# Patient Record
Sex: FEMALE | Race: WHITE | NOT HISPANIC OR LATINO | Employment: FULL TIME | URBAN - METROPOLITAN AREA
[De-identification: names, ages, dates, MRNs, and addresses within clinical notes are randomized per-mention and may not be internally consistent; named-entity substitution may affect disease eponyms.]

---

## 2017-06-24 ENCOUNTER — HOSPITAL ENCOUNTER (EMERGENCY)
Facility: HOSPITAL | Age: 67
Discharge: HOME | End: 2017-06-24
Attending: EMERGENCY MEDICINE
Payer: COMMERCIAL

## 2017-06-24 ENCOUNTER — APPOINTMENT (EMERGENCY)
Dept: RADIOLOGY | Facility: HOSPITAL | Age: 67
End: 2017-06-24
Payer: COMMERCIAL

## 2017-06-24 VITALS
TEMPERATURE: 98 F | BODY MASS INDEX: 29.72 KG/M2 | HEART RATE: 66 BPM | SYSTOLIC BLOOD PRESSURE: 143 MMHG | HEIGHT: 62 IN | RESPIRATION RATE: 16 BRPM | DIASTOLIC BLOOD PRESSURE: 74 MMHG | WEIGHT: 161.5 LBS | OXYGEN SATURATION: 98 %

## 2017-06-24 DIAGNOSIS — S20.212A CONTUSION OF LEFT CHEST WALL: ICD-10-CM
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
## 2017-06-24 PROCEDURE — 99283 EMERGENCY DEPT VISIT LOW MDM: CPT

## 2017-06-24 PROCEDURE — 71101 X-RAY EXAM UNILAT RIBS/CHEST: CPT

## 2017-06-24 RX ORDER — IBUPROFEN 400 MG/1
400 TABLET ORAL ONCE
Status: DISCONTINUED | OUTPATIENT
Start: 2017-06-24 | End: 2017-06-24 | Stop reason: HOSPADM

## 2020-09-29 ENCOUNTER — APPOINTMENT (OUTPATIENT)
Dept: MRI IMAGING | Facility: HOSPITAL | Age: 70
End: 2020-09-29
Payer: COMMERCIAL

## 2020-09-29 ENCOUNTER — HOSPITAL ENCOUNTER (OUTPATIENT)
Facility: HOSPITAL | Age: 70
Setting detail: OBSERVATION
Discharge: HOME | End: 2020-09-30
Attending: EMERGENCY MEDICINE | Admitting: INTERNAL MEDICINE
Payer: COMMERCIAL

## 2020-09-29 ENCOUNTER — APPOINTMENT (EMERGENCY)
Dept: CT IMAGING | Facility: HOSPITAL | Age: 70
End: 2020-09-29
Payer: COMMERCIAL

## 2020-09-29 DIAGNOSIS — R42 DIZZINESS: Primary | ICD-10-CM

## 2020-09-29 DIAGNOSIS — R77.8 ELEVATED TROPONIN: ICD-10-CM

## 2020-09-29 PROBLEM — I47.10 SVT (SUPRAVENTRICULAR TACHYCARDIA): Status: ACTIVE | Noted: 2020-09-29

## 2020-09-29 PROBLEM — I47.1 SVT (SUPRAVENTRICULAR TACHYCARDIA) (HCC): Status: ACTIVE | Noted: 2020-09-29

## 2020-09-29 PROBLEM — H33.20 RETINAL DETACHMENT: Status: ACTIVE | Noted: 2020-09-29

## 2020-09-29 LAB
ALBUMIN SERPL BCP-MCNC: 3.5 G/DL (ref 3.5–5)
ALP SERPL-CCNC: 57 U/L (ref 46–116)
ALT SERPL W P-5'-P-CCNC: 22 U/L (ref 12–78)
ANION GAP SERPL CALCULATED.3IONS-SCNC: 7 MMOL/L (ref 4–13)
AST SERPL W P-5'-P-CCNC: 15 U/L (ref 5–45)
ATRIAL RATE: 75 BPM
BASOPHILS # BLD AUTO: 0.06 THOUSANDS/ΜL (ref 0–0.1)
BASOPHILS NFR BLD AUTO: 1 % (ref 0–1)
BILIRUB SERPL-MCNC: 0.9 MG/DL (ref 0.2–1)
BUN SERPL-MCNC: 14 MG/DL (ref 5–25)
CALCIUM SERPL-MCNC: 9.4 MG/DL (ref 8.3–10.1)
CHLORIDE SERPL-SCNC: 103 MMOL/L (ref 100–108)
CO2 SERPL-SCNC: 28 MMOL/L (ref 21–32)
CREAT SERPL-MCNC: 0.93 MG/DL (ref 0.6–1.3)
EOSINOPHIL # BLD AUTO: 0.25 THOUSAND/ΜL (ref 0–0.61)
EOSINOPHIL NFR BLD AUTO: 4 % (ref 0–6)
ERYTHROCYTE [DISTWIDTH] IN BLOOD BY AUTOMATED COUNT: 12.8 % (ref 11.6–15.1)
GFR SERPL CREATININE-BSD FRML MDRD: 62 ML/MIN/1.73SQ M
GLUCOSE SERPL-MCNC: 113 MG/DL (ref 65–140)
HCT VFR BLD AUTO: 41.9 % (ref 34.8–46.1)
HGB BLD-MCNC: 13.8 G/DL (ref 11.5–15.4)
IMM GRANULOCYTES # BLD AUTO: 0.02 THOUSAND/UL (ref 0–0.2)
IMM GRANULOCYTES NFR BLD AUTO: 0 % (ref 0–2)
LYMPHOCYTES # BLD AUTO: 1.75 THOUSANDS/ΜL (ref 0.6–4.47)
LYMPHOCYTES NFR BLD AUTO: 25 % (ref 14–44)
MAGNESIUM SERPL-MCNC: 1.8 MG/DL (ref 1.6–2.6)
MCH RBC QN AUTO: 29.4 PG (ref 26.8–34.3)
MCHC RBC AUTO-ENTMCNC: 32.9 G/DL (ref 31.4–37.4)
MCV RBC AUTO: 89 FL (ref 82–98)
MONOCYTES # BLD AUTO: 0.56 THOUSAND/ΜL (ref 0.17–1.22)
MONOCYTES NFR BLD AUTO: 8 % (ref 4–12)
NEUTROPHILS # BLD AUTO: 4.26 THOUSANDS/ΜL (ref 1.85–7.62)
NEUTS SEG NFR BLD AUTO: 62 % (ref 43–75)
NRBC BLD AUTO-RTO: 0 /100 WBCS
P AXIS: 67 DEGREES
PLATELET # BLD AUTO: 230 THOUSANDS/UL (ref 149–390)
PLATELET # BLD AUTO: 239 THOUSANDS/UL (ref 149–390)
PMV BLD AUTO: 9.3 FL (ref 8.9–12.7)
PMV BLD AUTO: 9.4 FL (ref 8.9–12.7)
POTASSIUM SERPL-SCNC: 4.1 MMOL/L (ref 3.5–5.3)
PR INTERVAL: 138 MS
PROT SERPL-MCNC: 7.4 G/DL (ref 6.4–8.2)
QRS AXIS: 50 DEGREES
QRSD INTERVAL: 74 MS
QT INTERVAL: 388 MS
QTC INTERVAL: 433 MS
RBC # BLD AUTO: 4.69 MILLION/UL (ref 3.81–5.12)
SODIUM SERPL-SCNC: 138 MMOL/L (ref 136–145)
T WAVE AXIS: 37 DEGREES
TROPONIN I SERPL-MCNC: 0.03 NG/ML
TROPONIN I SERPL-MCNC: 0.04 NG/ML
TROPONIN I SERPL-MCNC: 0.06 NG/ML
TROPONIN I SERPL-MCNC: 0.08 NG/ML
TSH SERPL DL<=0.05 MIU/L-ACNC: 0.53 UIU/ML (ref 0.36–3.74)
VENTRICULAR RATE: 75 BPM
WBC # BLD AUTO: 6.9 THOUSAND/UL (ref 4.31–10.16)

## 2020-09-29 PROCEDURE — 99220 PR INITIAL OBSERVATION CARE/DAY 70 MINUTES: CPT | Performed by: GENERAL PRACTICE

## 2020-09-29 PROCEDURE — 70551 MRI BRAIN STEM W/O DYE: CPT

## 2020-09-29 PROCEDURE — 93010 ELECTROCARDIOGRAM REPORT: CPT | Performed by: INTERNAL MEDICINE

## 2020-09-29 PROCEDURE — 84484 ASSAY OF TROPONIN QUANT: CPT | Performed by: GENERAL PRACTICE

## 2020-09-29 PROCEDURE — 85049 AUTOMATED PLATELET COUNT: CPT | Performed by: GENERAL PRACTICE

## 2020-09-29 PROCEDURE — 93005 ELECTROCARDIOGRAM TRACING: CPT

## 2020-09-29 PROCEDURE — 83735 ASSAY OF MAGNESIUM: CPT | Performed by: PHYSICIAN ASSISTANT

## 2020-09-29 PROCEDURE — 80061 LIPID PANEL: CPT | Performed by: NURSE PRACTITIONER

## 2020-09-29 PROCEDURE — 99285 EMERGENCY DEPT VISIT HI MDM: CPT | Performed by: PHYSICIAN ASSISTANT

## 2020-09-29 PROCEDURE — 99285 EMERGENCY DEPT VISIT HI MDM: CPT

## 2020-09-29 PROCEDURE — 85025 COMPLETE CBC W/AUTO DIFF WBC: CPT | Performed by: PHYSICIAN ASSISTANT

## 2020-09-29 PROCEDURE — 36415 COLL VENOUS BLD VENIPUNCTURE: CPT | Performed by: PHYSICIAN ASSISTANT

## 2020-09-29 PROCEDURE — G1004 CDSM NDSC: HCPCS

## 2020-09-29 PROCEDURE — 84484 ASSAY OF TROPONIN QUANT: CPT | Performed by: PHYSICIAN ASSISTANT

## 2020-09-29 PROCEDURE — 80053 COMPREHEN METABOLIC PANEL: CPT | Performed by: PHYSICIAN ASSISTANT

## 2020-09-29 PROCEDURE — 84443 ASSAY THYROID STIM HORMONE: CPT | Performed by: PHYSICIAN ASSISTANT

## 2020-09-29 PROCEDURE — 70450 CT HEAD/BRAIN W/O DYE: CPT

## 2020-09-29 RX ORDER — ALBUTEROL SULFATE 90 UG/1
2 AEROSOL, METERED RESPIRATORY (INHALATION)
COMMUNITY
Start: 2020-07-06

## 2020-09-29 RX ORDER — ACETAMINOPHEN 325 MG/1
650 TABLET ORAL ONCE
Status: COMPLETED | OUTPATIENT
Start: 2020-09-29 | End: 2020-09-29

## 2020-09-29 RX ORDER — TRIPROLIDINE/PSEUDOEPHEDRINE 2.5MG-60MG
TABLET ORAL
COMMUNITY

## 2020-09-29 RX ORDER — ASPIRIN 81 MG/1
81 TABLET ORAL DAILY
Status: DISCONTINUED | OUTPATIENT
Start: 2020-09-29 | End: 2020-09-29

## 2020-09-29 RX ORDER — BRIMONIDINE TARTRATE 0.15 %
1 DROPS OPHTHALMIC (EYE) 2 TIMES DAILY
Status: DISCONTINUED | OUTPATIENT
Start: 2020-09-29 | End: 2020-09-30 | Stop reason: HOSPADM

## 2020-09-29 RX ORDER — KETOROLAC TROMETHAMINE 5 MG/ML
SOLUTION OPHTHALMIC
COMMUNITY
Start: 2020-06-24

## 2020-09-29 RX ORDER — BRIMONIDINE TARTRATE 0.15 %
1 DROPS OPHTHALMIC (EYE) 2 TIMES DAILY
Status: DISCONTINUED | OUTPATIENT
Start: 2020-09-29 | End: 2020-09-29

## 2020-09-29 RX ORDER — ASPIRIN 81 MG/1
81 TABLET ORAL DAILY
Status: DISCONTINUED | OUTPATIENT
Start: 2020-09-29 | End: 2020-09-30 | Stop reason: HOSPADM

## 2020-09-29 RX ORDER — LORAZEPAM 0.5 MG/1
0.5 TABLET ORAL EVERY 8 HOURS PRN
Status: DISCONTINUED | OUTPATIENT
Start: 2020-09-29 | End: 2020-09-29

## 2020-09-29 RX ORDER — ALBUTEROL SULFATE 90 UG/1
2 AEROSOL, METERED RESPIRATORY (INHALATION) EVERY 4 HOURS PRN
Status: DISCONTINUED | OUTPATIENT
Start: 2020-09-29 | End: 2020-09-30 | Stop reason: HOSPADM

## 2020-09-29 RX ORDER — HEPARIN SODIUM 5000 [USP'U]/ML
5000 INJECTION, SOLUTION INTRAVENOUS; SUBCUTANEOUS EVERY 8 HOURS SCHEDULED
Status: DISCONTINUED | OUTPATIENT
Start: 2020-09-29 | End: 2020-09-30 | Stop reason: HOSPADM

## 2020-09-29 RX ORDER — BRIMONIDINE TARTRATE 2 MG/ML
1 SOLUTION/ DROPS OPHTHALMIC 2 TIMES DAILY
COMMUNITY

## 2020-09-29 RX ORDER — METOPROLOL SUCCINATE 25 MG/1
TABLET, EXTENDED RELEASE ORAL
COMMUNITY
Start: 2020-07-24

## 2020-09-29 RX ORDER — LORAZEPAM 2 MG/ML
0.5 INJECTION INTRAMUSCULAR ONCE
Status: COMPLETED | OUTPATIENT
Start: 2020-09-29 | End: 2020-09-29

## 2020-09-29 RX ORDER — ACETAMINOPHEN 325 MG/1
650 TABLET ORAL EVERY 6 HOURS PRN
Status: DISCONTINUED | OUTPATIENT
Start: 2020-09-29 | End: 2020-09-30 | Stop reason: HOSPADM

## 2020-09-29 RX ORDER — OMEPRAZOLE 20 MG/1
20 CAPSULE, DELAYED RELEASE ORAL DAILY
COMMUNITY

## 2020-09-29 RX ORDER — PANTOPRAZOLE SODIUM 40 MG/1
40 TABLET, DELAYED RELEASE ORAL
Status: DISCONTINUED | OUTPATIENT
Start: 2020-09-30 | End: 2020-09-29

## 2020-09-29 RX ORDER — METOPROLOL SUCCINATE 25 MG/1
25 TABLET, EXTENDED RELEASE ORAL DAILY
Status: DISCONTINUED | OUTPATIENT
Start: 2020-09-29 | End: 2020-09-30 | Stop reason: HOSPADM

## 2020-09-29 RX ADMIN — HEPARIN SODIUM 5000 UNITS: 5000 INJECTION INTRAVENOUS; SUBCUTANEOUS at 21:04

## 2020-09-29 RX ADMIN — BRIMONIDINE TARTRATE 1 DROP: 1.5 SOLUTION OPHTHALMIC at 17:45

## 2020-09-29 RX ADMIN — ASPIRIN 81 MG: 81 TABLET, COATED ORAL at 21:04

## 2020-09-29 RX ADMIN — SODIUM CHLORIDE 500 ML: 0.9 INJECTION, SOLUTION INTRAVENOUS at 09:31

## 2020-09-29 RX ADMIN — LORAZEPAM 0.5 MG: 2 INJECTION INTRAMUSCULAR; INTRAVENOUS at 15:44

## 2020-09-29 RX ADMIN — ACETAMINOPHEN 650 MG: 325 TABLET, FILM COATED ORAL at 09:31

## 2020-09-30 ENCOUNTER — APPOINTMENT (OUTPATIENT)
Dept: NON INVASIVE DIAGNOSTICS | Facility: HOSPITAL | Age: 70
End: 2020-09-30
Payer: COMMERCIAL

## 2020-09-30 VITALS
HEART RATE: 71 BPM | BODY MASS INDEX: 28.52 KG/M2 | RESPIRATION RATE: 18 BRPM | OXYGEN SATURATION: 98 % | HEIGHT: 62 IN | DIASTOLIC BLOOD PRESSURE: 68 MMHG | TEMPERATURE: 97.2 F | SYSTOLIC BLOOD PRESSURE: 129 MMHG | WEIGHT: 155 LBS

## 2020-09-30 LAB
CHOLEST SERPL-MCNC: 224 MG/DL (ref 50–200)
HDLC SERPL-MCNC: 72 MG/DL
LDLC SERPL CALC-MCNC: 129 MG/DL (ref 0–100)
TRIGL SERPL-MCNC: 113 MG/DL

## 2020-09-30 PROCEDURE — 99217 PR OBSERVATION CARE DISCHARGE MANAGEMENT: CPT | Performed by: INTERNAL MEDICINE

## 2020-09-30 PROCEDURE — 93306 TTE W/DOPPLER COMPLETE: CPT | Performed by: INTERNAL MEDICINE

## 2020-09-30 PROCEDURE — 99204 OFFICE O/P NEW MOD 45 MIN: CPT | Performed by: PSYCHIATRY & NEUROLOGY

## 2020-09-30 PROCEDURE — 99204 OFFICE O/P NEW MOD 45 MIN: CPT | Performed by: INTERNAL MEDICINE

## 2020-09-30 PROCEDURE — 93306 TTE W/DOPPLER COMPLETE: CPT

## 2020-09-30 RX ORDER — ATORVASTATIN CALCIUM 20 MG/1
20 TABLET, FILM COATED ORAL
Status: DISCONTINUED | OUTPATIENT
Start: 2020-09-30 | End: 2020-09-30 | Stop reason: HOSPADM

## 2020-09-30 RX ORDER — ATORVASTATIN CALCIUM 20 MG/1
20 TABLET, FILM COATED ORAL
Qty: 30 TABLET | Refills: 1 | Status: SHIPPED | OUTPATIENT
Start: 2020-09-30

## 2020-09-30 RX ADMIN — METOPROLOL SUCCINATE 25 MG: 25 TABLET, EXTENDED RELEASE ORAL at 10:08

## 2020-09-30 RX ADMIN — HEPARIN SODIUM 5000 UNITS: 5000 INJECTION INTRAVENOUS; SUBCUTANEOUS at 05:05

## 2020-09-30 RX ADMIN — BRIMONIDINE TARTRATE 1 DROP: 1.5 SOLUTION OPHTHALMIC at 10:10

## 2022-11-19 ENCOUNTER — OFFICE VISIT (OUTPATIENT)
Dept: URGENT CARE | Facility: CLINIC | Age: 72
End: 2022-11-19

## 2022-11-19 VITALS
RESPIRATION RATE: 16 BRPM | SYSTOLIC BLOOD PRESSURE: 153 MMHG | DIASTOLIC BLOOD PRESSURE: 69 MMHG | TEMPERATURE: 98.5 F | OXYGEN SATURATION: 98 % | HEART RATE: 80 BPM

## 2022-11-19 DIAGNOSIS — R51.9 INTRACTABLE HEADACHE, UNSPECIFIED CHRONICITY PATTERN, UNSPECIFIED HEADACHE TYPE: ICD-10-CM

## 2022-11-19 DIAGNOSIS — R11.2 NAUSEA AND VOMITING, UNSPECIFIED VOMITING TYPE: Primary | ICD-10-CM

## 2022-11-19 DIAGNOSIS — U07.1 COVID-19: ICD-10-CM

## 2022-11-19 RX ORDER — PYRIDOXINE HCL (VITAMIN B6) 50 MG
TABLET ORAL
COMMUNITY

## 2022-11-19 RX ORDER — DORZOLAMIDE HYDROCHLORIDE AND TIMOLOL MALEATE 20; 5 MG/ML; MG/ML
SOLUTION/ DROPS OPHTHALMIC
COMMUNITY
Start: 2022-09-28

## 2022-11-19 RX ORDER — VIT C/B6/B5/MAGNESIUM/HERB 173 50-5-6-5MG
500 CAPSULE ORAL DAILY
COMMUNITY

## 2022-11-19 RX ORDER — ONDANSETRON 4 MG/1
4 TABLET, ORALLY DISINTEGRATING ORAL ONCE
Status: COMPLETED | OUTPATIENT
Start: 2022-11-19 | End: 2022-11-19

## 2022-11-19 RX ORDER — ZINC GLUCONATE 50 MG
50 TABLET ORAL DAILY
COMMUNITY

## 2022-11-19 RX ORDER — OMEPRAZOLE 20 MG/1
CAPSULE, DELAYED RELEASE ORAL
COMMUNITY

## 2022-11-19 RX ORDER — PREDNISOLONE ACETATE 10 MG/ML
SUSPENSION/ DROPS OPHTHALMIC
COMMUNITY
Start: 2022-09-28

## 2022-11-19 RX ORDER — KETOROLAC TROMETHAMINE 4 MG/ML
SOLUTION/ DROPS OPHTHALMIC
COMMUNITY
Start: 2022-09-29

## 2022-11-19 RX ORDER — ONDANSETRON 4 MG/1
TABLET, ORALLY DISINTEGRATING ORAL
Qty: 12 TABLET | Refills: 0 | Status: SHIPPED | OUTPATIENT
Start: 2022-11-19

## 2022-11-19 RX ADMIN — ONDANSETRON 4 MG: 4 TABLET, ORALLY DISINTEGRATING ORAL at 08:41

## 2022-11-19 NOTE — PATIENT INSTRUCTIONS
If unable to keep any fluids down, go directly to the ER  Try to encourage lots of fluids and rest  F/u with PCP in 2 days  Zofran as directed  Hydration and rest  Quarantine 5 days from the onset of symptoms, and fever free for 24 hrs  Wear your mask for 5 days after quarantine and wash hands often  PCP follow up in 3-5 days; call them first  Go to an emergency department if difficulty breathing occurs  Recommended supplements for potential COVID-19 is the following: Vitamin D3 2000 IU  daily ,  Vitamin C 1g  every 12 hours , Multivitamin Daily       COVID-19 Home Care Guidelines    Your healthcare provider and/or public health staff have evaluated you and have determined that you do not need to remain in the hospital at this time  At this time you can be isolated at home where you will be monitored by staff from your local or state health department  You should carefully follow the prevention and isolation steps below until a healthcare provider or local or state health department says that you can return to your normal activities  Stay home except to get medical care    People who are mildly ill with COVID-19 are able to isolate at home during their illness  You should restrict activities outside your home, except for getting medical care  Do not go to work, school, or public areas  Avoid using public transportation, ride-sharing, or taxis  Separate yourself from other people and animals in your home    People: As much as possible, you should stay in a specific room and away from other people in your home  Also, you should use a separate bathroom, if available  Animals: You should restrict contact with pets and other animals while you are sick with COVID-19, just like you would around other people   Although there have not been reports of pets or other animals becoming sick with COVID-19, it is still recommended that people sick with COVID-19 limit contact with animals until more information is known about the virus  When possible, have another member of your household care for your animals while you are sick  If you are sick with COVID-19, avoid contact with your pet, including petting, snuggling, being kissed or licked, and sharing food  If you must care for your pet or be around animals while you are sick, wash your hands before and after you interact with pets and wear a facemask  See COVID-19 and Animals for more information  Call ahead before visiting your doctor    If you have a medical appointment, call the healthcare provider and tell them that you have or may have COVID-19  This will help the healthcare provider’s office take steps to keep other people from getting infected or exposed  Wear a facemask    You should wear a facemask when you are around other people (e g , sharing a room or vehicle) or pets and before you enter a healthcare provider’s office  If you are not able to wear a facemask (for example, because it causes trouble breathing), then people who live with you should not stay in the same room with you, or they should wear a facemask if they enter your room  Cover your coughs and sneezes    Cover your mouth and nose with a tissue when you cough or sneeze  Throw used tissues in a lined trash can  Immediately wash your hands with soap and water for at least 20 seconds or, if soap and water are not available, clean your hands with an alcohol-based hand  that contains at least 60% alcohol  Clean your hands often    Wash your hands often with soap and water for at least 20 seconds, especially after blowing your nose, coughing, or sneezing; going to the bathroom; and before eating or preparing food  If soap and water are not readily available, use an alcohol-based hand  with at least 60% alcohol, covering all surfaces of your hands and rubbing them together until they feel dry  Soap and water are the best option if hands are visibly dirty   Avoid touching your eyes, nose, and mouth with unwashed hands  Avoid sharing personal household items    You should not share dishes, drinking glasses, cups, eating utensils, towels, or bedding with other people or pets in your home  After using these items, they should be washed thoroughly with soap and water  Clean all “high-touch” surfaces everyday    High touch surfaces include counters, tabletops, doorknobs, bathroom fixtures, toilets, phones, keyboards, tablets, and bedside tables  Also, clean any surfaces that may have blood, stool, or body fluids on them  Use a household cleaning spray or wipe, according to the label instructions  Labels contain instructions for safe and effective use of the cleaning product including precautions you should take when applying the product, such as wearing gloves and making sure you have good ventilation during use of the product  Monitor your symptoms    Seek prompt medical attention if your illness is worsening (e g , difficulty breathing)  Before seeking care, call your healthcare provider and tell them that you have, or are being evaluated for, COVID-19  Put on a facemask before you enter the facility  These steps will help the healthcare provider’s office to keep other people in the office or waiting room from getting infected or exposed  Ask your healthcare provider to call the local or state health department  Persons who are placed under active monitoring or facilitated self-monitoring should follow instructions provided by their local health department or occupational health professionals, as appropriate  If you have a medical emergency and need to call 911, notify the dispatch personnel that you have, or are being evaluated for COVID-19  If possible, put on a facemask before emergency medical services arrive  Discontinuing home isolation    Patients with confirmed COVID-19 should remain under home isolation precautions until the following conditions are met:    They have had no fever for at least 24 hours (that is one full day of no fever without the use medicine that reduces fevers)  AND  other symptoms have improved (for example, when their cough or shortness of breath have improved)  AND  If had mild or moderate illness, at least 10 days have passed since their symptoms first appeared or if severe illness (needed oxygen) or immunosuppressed, at least 20 days have passed since symptoms first appeared  Patients with confirmed COVID-19 should also notify close contacts (including their workplace) and ask that they self-quarantine  Currently, close contact is defined as being within 6 feet for 15 minutes or more from the period 24 hours starting 48 hours before symptom onset to the time at which the patient went into isolation  Close contacts of patients diagnosed with COVID-19 should be instructed by the patient to self-quarantine for 14 days from the last time of their last contact with the patient       Source: [employer]

## 2022-11-19 NOTE — PROGRESS NOTES
[facility]        NAME: Debbie Maciel is a 67 y o  female  : 1950    MRN: [id number]  DATE: 2022  TIME: 9:29 AM    Assessment and Plan   Nausea and vomiting, unspecified vomiting type [R11 2]  1  Nausea and vomiting, unspecified vomiting type  ondansetron (ZOFRAN-ODT) 4 mg disintegrating tablet    ondansetron (ZOFRAN-ODT) dispersible tablet 4 mg    Transfer to other facility    CANCELED: Covid/Flu-Office Collect      2  Intractable headache, unspecified chronicity pattern, unspecified headache type        3  COVID-19          Rapid Covid is (+)    I offered to call an ambulance for pt , but pt  Declined; states her  will take her  Patient Instructions   Patient Instructions   1  If unable to keep any fluids down, go directly to the ER  2  Try to encourage lots of fluids and rest  3  F/u with PCP in 2 days  4  Zofran as directed  5  Hydration and rest  6  Quarantine 5 days from the onset of symptoms, and fever free for 24 hrs  7  Wear your mask for 5 days after quarantine and wash hands often  8  PCP follow up in 3-5 days; call them first  9  Go to an emergency department if difficulty breathing occurs  10    11  Recommended supplements for potential COVID-19 is the following: Vitamin D3 2000 IU  daily ,  Vitamin C 1g  every 12 hours , Multivitamin Daily   12    13    14  COVID-19 Home Care Guidelines  15    16  Your healthcare provider and/or public health staff have evaluated you and have determined that you do not need to remain in the hospital at this time  At this time you can be isolated at home where you will be monitored by staff from your local or state health department  You should carefully follow the prevention and isolation steps below until a healthcare provider or local or state health department says that you can return to your normal activities    16    18    19  Stay home except to get medical care  20    21  People who are mildly ill with COVID-19 are able to isolate at home during their illness  You should restrict activities outside your home, except for getting medical care  Do not go to work, school, or public areas  Avoid using public transportation, ride-sharing, or taxis  22    23  Separate yourself from other people and animals in your home  24    25  People: As much as possible, you should stay in a specific room and away from other people in your home  Also, you should use a separate bathroom, if available  26  Animals: You should restrict contact with pets and other animals while you are sick with COVID-19, just like you would around other people  Although there have not been reports of pets or other animals becoming sick with COVID-19, it is still recommended that people sick with COVID-19 limit contact with animals until more information is known about the virus  When possible, have another member of your household care for your animals while you are sick  If you are sick with COVID-19, avoid contact with your pet, including petting, snuggling, being kissed or licked, and sharing food  If you must care for your pet or be around animals while you are sick, wash your hands before and after you interact with pets and wear a facemask  See COVID-19 and Animals for more information  27    28  Call ahead before visiting your doctor  29    30  If you have a medical appointment, call the healthcare provider and tell them that you have or may have COVID-19  This will help the healthcare provider’s office take steps to keep other people from getting infected or exposed  31    32  Wear a facemask  33    34  You should wear a facemask when you are around other people (e g , sharing a room or vehicle) or pets and before you enter a healthcare provider’s office   If you are not able to wear a facemask (for example, because it causes trouble breathing), then people who live with you should not stay in the same room with you, or they should wear a facemask if they enter your room  35    36  Cover your coughs and sneezes  37    38  Cover your mouth and nose with a tissue when you cough or sneeze  Throw used tissues in a lined trash can  Immediately wash your hands with soap and water for at least 20 seconds or, if soap and water are not available, clean your hands with an alcohol-based hand  that contains at least 60% alcohol  39    40  Clean your hands often  41    42  Wash your hands often with soap and water for at least 20 seconds, especially after blowing your nose, coughing, or sneezing; going to the bathroom; and before eating or preparing food  If soap and water are not readily available, use an alcohol-based hand  with at least 60% alcohol, covering all surfaces of your hands and rubbing them together until they feel dry  43  Soap and water are the best option if hands are visibly dirty  Avoid touching your eyes, nose, and mouth with unwashed hands  44    45  Avoid sharing personal household items  55    47  You should not share dishes, drinking glasses, cups, eating utensils, towels, or bedding with other people or pets in your home  After using these items, they should be washed thoroughly with soap and water  48    49  Clean all “high-touch” surfaces everyday  50    51  High touch surfaces include counters, tabletops, doorknobs, bathroom fixtures, toilets, phones, keyboards, tablets, and bedside tables  Also, clean any surfaces that may have blood, stool, or body fluids on them  Use a household cleaning spray or wipe, according to the label instructions  Labels contain instructions for safe and effective use of the cleaning product including precautions you should take when applying the product, such as wearing gloves and making sure you have good ventilation during use of the product  52    53  Monitor your symptoms  54    55  Seek prompt medical attention if your illness is worsening (e g , difficulty breathing)   Before seeking care, call your healthcare provider and tell them that you have, or are being evaluated for, COVID-19  Put on a facemask before you enter the facility  These steps will help the healthcare provider’s office to keep other people in the office or waiting room from getting infected or exposed  Ask your healthcare provider to call the local or [facility] health department  Persons who are placed under active monitoring or facilitated self-monitoring should follow instructions provided by their local health department or occupational health professionals, as appropriate  56  If you have a medical emergency and need to call 911, notify the dispatch personnel that you have, or are being evaluated for COVID-19  If possible, put on a facemask before emergency medical services arrive  57    58  Discontinuing home isolation  59    60  Patients with confirmed COVID-19 should remain under home isolation precautions until the following conditions are met:   - They have had no fever for at least 24 hours (that is one full day of no fever without the use medicine that reduces fevers)  AND  - other symptoms have improved (for example, when their cough or shortness of breath have improved)  AND  - If had mild or moderate illness, at least 10 days have passed since their symptoms first appeared or if severe illness (needed oxygen) or immunosuppressed, at least 20 days have passed since symptoms first appeared  61  Patients with confirmed COVID-19 should also notify close contacts (including their workplace) and ask that they self-quarantine  Currently, close contact is defined as being within 6 feet for 15 minutes or more from the period 24 hours starting 48 hours before symptom onset to the time at which the patient went into isolation  Close contacts of patients diagnosed with COVID-19 should be instructed by the patient to self-quarantine for 14 days from the last time of their last contact with the patient     62    63  Source: R[facility]            Follow up with PCP in 3-5 days  Proceed to  ER if symptoms worsen  Chief Complaint     Chief Complaint   Patient presents with   • Vomiting     SINCE Wednesday, CAN'T HOLD ANY FOOD OR WATER DOWN  NO REAL PAIN, JUST CAN'T HOLD ANYTHING IN  NO DIARRHEA  • Headache     FROM ALL THE VOMITTING         History of Present Illness       66 yo female with cc vomiting  Pt  States she ate some chicken and rice and then began vomiting about 1 hr later  Pt  States since Wednesday night, she has not been able to keep any fluids down, including water  Pt  Denies fever; + chills and + headache  Review of Systems   Review of Systems   Constitutional: Negative for chills and fever  HENT: Negative for congestion and rhinorrhea  Eyes: Negative for discharge and visual disturbance  Respiratory: Negative for shortness of breath and wheezing  Cardiovascular: Negative for chest pain and palpitations  Gastrointestinal: Positive for nausea and vomiting  Negative for abdominal pain  Endocrine: Negative for polydipsia and polyuria  Genitourinary: Negative for dysuria and hematuria  Musculoskeletal: Negative for arthralgias, gait problem and neck stiffness  Skin: Negative for rash and wound  Neurological: Positive for headaches  Negative for dizziness  Psychiatric/Behavioral: Negative for confusion and suicidal ideas           Current Medications       Current Outpatient Medications:   •  albuterol (PROVENTIL HFA,VENTOLIN HFA) 90 mcg/act inhaler, Inhale 2 puffs, Disp: , Rfl:   •  ascorbic acid (VITAMIN C) 1000 MG tablet, Take 1,000 mg by mouth daily, Disp: , Rfl:   •  brimonidine tartrate 0 2 % ophthalmic solution, Apply 1 drop to eye 2 (two) times a day, Disp: , Rfl:   •  Cholecalciferol 25 MCG (1000 UT) tablet, Take 1,000 Units by mouth daily, Disp: , Rfl:   •  Difluprednate (Durezol) 0 05 % EMUL, Durezol 0 05 % eye drops, Disp: , Rfl:   •  dorzolamide-timolol (COSOPT) 22 3-6 8 MG/ML ophthalmic solution, , Disp: , Rfl:   •  ketorolac (ACULAR) 0 5 % ophthalmic solution, , Disp: , Rfl:   •  metoprolol succinate (TOPROL-XL) 25 mg 24 hr tablet, metoprolol succinate ER 25 mg tablet,extended release 24 hr, Disp: , Rfl:   •  omeprazole (PriLOSEC) 20 mg delayed release capsule, omeprazole 20 mg capsule,delayed release, Disp: , Rfl:   •  ondansetron (ZOFRAN-ODT) 4 mg disintegrating tablet, Place 1 tab (4 mg) under your tongue every 6 hours for nausea or vomiting , Disp: 12 tablet, Rfl: 0  •  prednisoLONE acetate (PRED FORTE) 1 % ophthalmic suspension, , Disp: , Rfl:   •  pyridoxine (VITAMIN B6) 50 mg tablet, Take by mouth, Disp: , Rfl:   •  Turmeric 500 MG CAPS, Take 500 mg by mouth daily, Disp: , Rfl:   •  Zinc 50 MG TABS, Take 50 mg by mouth daily, Disp: , Rfl:   •  atorvastatin (LIPITOR) 20 mg tablet, Take 1 tablet (20 mg total) by mouth daily with dinner (Patient not taking: Reported on 11/19/2022), Disp: 30 tablet, Rfl: 1  •  ketorolac (ACULAR) 0 4 % SOLN, , Disp: , Rfl:   No current facility-administered medications for this visit  Current Allergies     Allergies as of 11/19/2022 - Reviewed 11/19/2022   Allergen Reaction Noted   • Bactrim [sulfamethoxazole-trimethoprim] Rash 06/24/2017            The following portions of the patient's history were reviewed and updated as appropriate: allergies, current medications, past family history, past medical history, past social history, past surgical history and problem list      Past Medical History:   Diagnosis Date   • SVT (supraventricular tachycardia) ([address] )        Past Surgical History:   Procedure Laterality Date   • CATARACT EXTRACTION, BILATERAL     • RETINAL DETACHMENT SURGERY         No family history on file  Medications have been verified          Objective   /69   Pulse 80   Temp 98 5 °F (36 9 °C)   Resp 16   SpO2 98%        Physical Exam     Physical Exam  Vitals reviewed  Constitutional:       General: She is not in acute distress  Appearance: She is well-developed  She is not ill-appearing, toxic-appearing or diaphoretic  Comments: 68 yo w female sitting on the stretcher who appears somewhat dry  HENT:      Head: Normocephalic and atraumatic  Right Ear: Tympanic membrane normal       Left Ear: Tympanic membrane normal       Nose: No congestion or rhinorrhea  Mouth/Throat:      Mouth: Mucous membranes are dry  Pharynx: Oropharynx is clear  No posterior oropharyngeal erythema  Comments: + dry mouth  Eyes:      General: No scleral icterus  Extraocular Movements: Extraocular movements intact  Pupils: Pupils are equal, round, and reactive to light  Cardiovascular:      Rate and Rhythm: Normal rate and regular rhythm  Pulses: Normal pulses  Heart sounds: Normal heart sounds  Pulmonary:      Effort: Pulmonary effort is normal  No respiratory distress  Breath sounds: Normal breath sounds  No stridor  No wheezing, rhonchi or rales  Chest:      Chest wall: No tenderness  Abdominal:      General: Abdomen is flat  Bowel sounds are normal  There is no distension  Palpations: Abdomen is soft  There is no shifting dullness, hepatomegaly, splenomegaly or mass  Tenderness: There is no abdominal tenderness  There is no right CVA tenderness, left CVA tenderness or guarding  Negative signs include Alatorre's sign and McBurney's sign  Hernia: No hernia is present  Musculoskeletal:         General: Normal range of motion  Cervical back: Normal range of motion and neck supple  Skin:     General: Skin is warm and dry  Coloration: Skin is not cyanotic, jaundiced, mottled or pale  Findings: No erythema  Neurological:      General: No focal deficit present  Mental Status: She is alert and oriented to person, place, and time        Comments: + left sided headache   Psychiatric: Mood and Affect: Mood normal

## 2022-11-19 NOTE — LETTER
November 19, 2022     Patient: Gianna Alvarez   YOB: 1950   Date of Visit: 11/19/2022       To Whom It May Concern: It is my medical opinion that Liss Sales should remain out of work until 11/22/2022  If you have any questions or concerns, please don't hesitate to call           Sincerely,        Romina Jansen DO    CC: No Recipients

## 2023-02-20 ENCOUNTER — HOSPITAL ENCOUNTER (EMERGENCY)
Facility: HOSPITAL | Age: 73
Discharge: HOME | End: 2023-02-20
Attending: EMERGENCY MEDICINE

## 2023-02-20 ENCOUNTER — APPOINTMENT (EMERGENCY)
Dept: RADIOLOGY | Facility: HOSPITAL | Age: 73
End: 2023-02-20

## 2023-02-20 VITALS
WEIGHT: 157 LBS | BODY MASS INDEX: 28.72 KG/M2 | TEMPERATURE: 98.1 F | RESPIRATION RATE: 18 BRPM | HEART RATE: 92 BPM | OXYGEN SATURATION: 98 % | DIASTOLIC BLOOD PRESSURE: 86 MMHG | SYSTOLIC BLOOD PRESSURE: 179 MMHG

## 2023-02-20 DIAGNOSIS — M25.561 ACUTE PAIN OF RIGHT KNEE: Primary | ICD-10-CM

## 2023-02-20 RX ORDER — PREDNISONE 20 MG/1
40 TABLET ORAL DAILY
Qty: 10 TABLET | Refills: 0 | Status: SHIPPED | OUTPATIENT
Start: 2023-02-20

## 2023-02-20 RX ORDER — PREDNISONE 20 MG/1
40 TABLET ORAL ONCE
Status: COMPLETED | OUTPATIENT
Start: 2023-02-20 | End: 2023-02-20

## 2023-02-20 RX ADMIN — PREDNISONE 40 MG: 20 TABLET ORAL at 19:22

## 2023-02-21 ENCOUNTER — HOSPITAL ENCOUNTER (OUTPATIENT)
Dept: VASCULAR ULTRASOUND | Facility: HOSPITAL | Age: 73
Discharge: HOME | End: 2023-02-21
Attending: EMERGENCY MEDICINE

## 2023-02-21 DIAGNOSIS — M25.561 ACUTE PAIN OF RIGHT KNEE: ICD-10-CM

## 2023-02-21 NOTE — DISCHARGE INSTRUCTIONS
As we discussed, take the steroids and follow-up with your orthopedic doctor in the next week or come back to the ER if your pain or swelling get worse or if you have a fever or any other concerns

## 2023-02-24 NOTE — ED PROVIDER NOTES
History  Chief Complaint   Patient presents with   • Knee Pain     Since this morning, swelling noted  Denies any trauma  Ambulated to triage w/ steady gait  Atraumatic pain in R knee since yesterday  Some associated swelling  Concerned she may have a blood clot, denies h/o same  No cough, sob, hemoptysis, syncope or near syncope, recent surgery/trauma/immobilization  Prior to Admission Medications   Prescriptions Last Dose Informant Patient Reported? Taking? Cholecalciferol 25 MCG (1000 UT) tablet   Yes No   Sig: Take 1,000 Units by mouth daily   Difluprednate (Durezol) 0 05 % EMUL   Yes No   Sig: Durezol 0 05 % eye drops   Turmeric 500 MG CAPS   Yes No   Sig: Take 500 mg by mouth daily   Zinc 50 MG TABS   Yes No   Sig: Take 50 mg by mouth daily   albuterol (PROVENTIL HFA,VENTOLIN HFA) 90 mcg/act inhaler   Yes No   Sig: Inhale 2 puffs   ascorbic acid (VITAMIN C) 1000 MG tablet   Yes No   Sig: Take 1,000 mg by mouth daily   atorvastatin (LIPITOR) 20 mg tablet   No No   Sig: Take 1 tablet (20 mg total) by mouth daily with dinner   Patient not taking: Reported on 11/19/2022   brimonidine tartrate 0 2 % ophthalmic solution   Yes No   Sig: Apply 1 drop to eye 2 (two) times a day   dorzolamide-timolol (COSOPT) 22 3-6 8 MG/ML ophthalmic solution   Yes No   ketorolac (ACULAR) 0 4 % SOLN   Yes No   Patient not taking: Reported on 11/19/2022   ketorolac (ACULAR) 0 5 % ophthalmic solution   Yes No   metoprolol succinate (TOPROL-XL) 25 mg 24 hr tablet   Yes No   Sig: metoprolol succinate ER 25 mg tablet,extended release 24 hr   omeprazole (PriLOSEC) 20 mg delayed release capsule   Yes No   Sig: omeprazole 20 mg capsule,delayed release   ondansetron (ZOFRAN-ODT) 4 mg disintegrating tablet   No No   Sig: Place 1 tab (4 mg) under your tongue every 6 hours for nausea or vomiting     prednisoLONE acetate (PRED FORTE) 1 % ophthalmic suspension   Yes No   pyridoxine (VITAMIN B6) 50 mg tablet   Yes No   Sig: Take by mouth      Facility-Administered Medications: None       Past Medical History:   Diagnosis Date   • SVT (supraventricular tachycardia) (HCC)        Past Surgical History:   Procedure Laterality Date   • CATARACT EXTRACTION, BILATERAL     • RETINAL DETACHMENT SURGERY         History reviewed  No pertinent family history  I have reviewed and agree with the history as documented  E-Cigarette/Vaping   • E-Cigarette Use Never User      E-Cigarette/Vaping Substances   • Nicotine No    • THC No    • CBD No    • Flavoring No    • Other No    • Unknown No      Social History     Tobacco Use   • Smoking status: Never   • Smokeless tobacco: Never   Vaping Use   • Vaping Use: Never used   Substance Use Topics   • Alcohol use: Yes     Comment: social   • Drug use: No       Review of Systems   Musculoskeletal: Positive for arthralgias  Physical Exam  Physical Exam  Vitals and nursing note reviewed  Constitutional:       General: She is not in acute distress  Appearance: She is well-developed  She is not diaphoretic  HENT:      Head: Normocephalic and atraumatic  Eyes:      Conjunctiva/sclera: Conjunctivae normal       Pupils: Pupils are equal, round, and reactive to light  Neck:      Vascular: No JVD  Cardiovascular:      Rate and Rhythm: Normal rate and regular rhythm  Heart sounds: Normal heart sounds  Pulmonary:      Effort: Pulmonary effort is normal       Breath sounds: Normal breath sounds  No stridor  Abdominal:      General: There is no distension  Palpations: Abdomen is soft  Tenderness: There is no abdominal tenderness  There is no guarding or rebound  Musculoskeletal:         General: Swelling present  No tenderness, deformity or signs of injury  Normal range of motion  Cervical back: Normal range of motion and neck supple  Skin:     General: Skin is warm and dry  Capillary Refill: Capillary refill takes less than 2 seconds  Coloration: Skin is not pale  Findings: No erythema or rash  Neurological:      Mental Status: She is alert and oriented to person, place, and time  Cranial Nerves: No cranial nerve deficit  Sensory: No sensory deficit  Motor: No abnormal muscle tone  Coordination: Coordination normal          Vital Signs  ED Triage Vitals [02/20/23 1845]   Temperature Pulse Respirations Blood Pressure SpO2   98 1 °F (36 7 °C) 92 18 (!) 179/86 98 %      Temp src Heart Rate Source Patient Position - Orthostatic VS BP Location FiO2 (%)   -- Monitor -- -- --      Pain Score       --           Vitals:    02/20/23 1845   BP: (!) 179/86   Pulse: 92         Visual Acuity      ED Medications  Medications   predniSONE tablet 40 mg (40 mg Oral Given 2/20/23 1922)       Diagnostic Studies  Results Reviewed     None                 XR knee 4+ vw right injury   Final Result by Cari Duque MD (02/21 1010)      No acute osseous abnormality  Degenerative changes as described  Workstation performed: [id number]                    Procedures  Procedures         ED Course                                             Medical Decision Making  Acute pain of right knee: complicated acute illness or injury     Details: at risk for DVT  LE duplex ordered to evaluate for same  no fever, low concern for septic arthritis, no arthrocentesis at this time based on shared decision making  Amount and/or Complexity of Data Reviewed  Radiology: ordered and independent interpretation performed  ECG/medicine tests: ordered  Risk  Prescription drug management            Disposition  Final diagnoses:   Acute pain of right knee     Time reflects when diagnosis was documented in both MDM as applicable and the Disposition within this note     Time User Action Codes Description Comment    2/20/2023  7:19 PM Eliot Christiansen Add [M20 524] Acute pain of right knee       ED Disposition     ED Disposition   Discharge    Condition   Stable    Date/Time   Mon Feb 20, 2023  7:19 PM    Comment   Aurora Paz discharge to home/self care                 Follow-up Information    None         Discharge Medication List as of 2/20/2023  7:21 PM      START taking these medications    Details   predniSONE 20 mg tablet Take 2 tablets (40 mg total) by mouth daily, Starting Mon 2/20/2023, Print         CONTINUE these medications which have NOT CHANGED    Details   albuterol (PROVENTIL HFA,VENTOLIN HFA) 90 mcg/act inhaler Inhale 2 puffs, Starting Mon 7/6/2020, Historical Med      ascorbic acid (VITAMIN C) 1000 MG tablet Take 1,000 mg by mouth daily, Historical Med      atorvastatin (LIPITOR) 20 mg tablet Take 1 tablet (20 mg total) by mouth daily with dinner, Starting Wed 9/30/2020, Normal      brimonidine tartrate 0 2 % ophthalmic solution Apply 1 drop to eye 2 (two) times a day, Historical Med      Cholecalciferol 25 MCG (1000 UT) tablet Take 1,000 Units by mouth daily, Historical Med      Difluprednate (Durezol) 0 05 % EMUL Durezol 0 05 % eye drops, Historical Med      dorzolamide-timolol (COSOPT) 22 3-6 8 MG/ML ophthalmic solution Starting Wed 9/28/2022, Historical Med      ketorolac (ACULAR) 0 4 % SOLN Starting Thu 9/29/2022, Historical Med      ketorolac (ACULAR) 0 5 % ophthalmic solution Starting Wed 6/24/2020, Historical Med      metoprolol succinate (TOPROL-XL) 25 mg 24 hr tablet metoprolol succinate ER 25 mg tablet,extended release 24 hr, Historical Med      omeprazole (PriLOSEC) 20 mg delayed release capsule omeprazole 20 mg capsule,delayed release, Historical Med      ondansetron (ZOFRAN-ODT) 4 mg disintegrating tablet Place 1 tab (4 mg) under your tongue every 6 hours for nausea or vomiting , Normal      prednisoLONE acetate (PRED FORTE) 1 % ophthalmic suspension Starting Wed 9/28/2022, Historical Med      pyridoxine (VITAMIN B6) 50 mg tablet Take by mouth, Historical Med      Turmeric 500 MG CAPS Take 500 mg by mouth daily, Historical Med      Zinc 50 MG TABS Take 50 mg by mouth daily, Historical Med             Outpatient Discharge Orders   VAS lower limb venous duplex study, unilateral/limited   Standing Status: Future Number of Occurrences: 1 Standing Exp   Date: 02/20/27       PDMP Review     None          ED Provider  Electronically Signed by           Kashif Batista MD  02/24/23 1380

## 2023-02-28 ENCOUNTER — OFFICE VISIT (OUTPATIENT)
Dept: OBGYN CLINIC | Facility: CLINIC | Age: 73
End: 2023-02-28

## 2023-02-28 VITALS
BODY MASS INDEX: 27.94 KG/M2 | SYSTOLIC BLOOD PRESSURE: 144 MMHG | WEIGHT: 151.8 LBS | HEART RATE: 56 BPM | DIASTOLIC BLOOD PRESSURE: 75 MMHG | HEIGHT: 62 IN

## 2023-02-28 DIAGNOSIS — M17.11 PRIMARY OSTEOARTHRITIS OF RIGHT KNEE: Primary | ICD-10-CM

## 2023-02-28 RX ORDER — BETAMETHASONE SODIUM PHOSPHATE AND BETAMETHASONE ACETATE 3; 3 MG/ML; MG/ML
12 INJECTION, SUSPENSION INTRA-ARTICULAR; INTRALESIONAL; INTRAMUSCULAR; SOFT TISSUE
Status: COMPLETED | OUTPATIENT
Start: 2023-02-28 | End: 2023-02-28

## 2023-02-28 RX ORDER — BUPIVACAINE HYDROCHLORIDE 2.5 MG/ML
2 INJECTION, SOLUTION INFILTRATION; PERINEURAL
Status: COMPLETED | OUTPATIENT
Start: 2023-02-28 | End: 2023-02-28

## 2023-02-28 RX ORDER — LIDOCAINE HYDROCHLORIDE 10 MG/ML
4 INJECTION, SOLUTION INFILTRATION; PERINEURAL
Status: COMPLETED | OUTPATIENT
Start: 2023-02-28 | End: 2023-02-28

## 2023-02-28 RX ADMIN — LIDOCAINE HYDROCHLORIDE 4 ML: 10 INJECTION, SOLUTION INFILTRATION; PERINEURAL at 11:21

## 2023-02-28 RX ADMIN — BUPIVACAINE HYDROCHLORIDE 2 ML: 2.5 INJECTION, SOLUTION INFILTRATION; PERINEURAL at 11:21

## 2023-02-28 RX ADMIN — BETAMETHASONE SODIUM PHOSPHATE AND BETAMETHASONE ACETATE 12 MG: 3; 3 INJECTION, SUSPENSION INTRA-ARTICULAR; INTRALESIONAL; INTRAMUSCULAR; SOFT TISSUE at 11:21

## 2023-02-28 NOTE — LETTER
February 28, 2023     Patient: Qi Edwards  YOB: 1950  Date of Visit: 2/28/2023      To Whom it May Concern:    Priscilla Douglas is under my professional care  ora Dakin was seen in my office on 2/28/2023  Genora Dakin may return to work on 2/28/2023 with no limitations  If you have any questions or concerns, please don't hesitate to call           Sincerely,          Angelique Gooden MD        CC: No Recipients

## 2023-02-28 NOTE — PROGRESS NOTES
Orthopaedics Office Visit - New Patient Visit    ASSESSMENT/PLAN:    Assessment:   Right knee osteoarthritis    Plan:   · Patient was offered, accepted, and received a cortisone injection of the right knee  Risks and benefits of CSI were discussed with the patient  The corticosteroid injection was administered without any immediate complication and was well tolerated by the patient  This was done under sterile technique  Post injection instructions and expectations were discussed  It was explained to the patient that cortisone injections can be repeated as early as every 3 months  · Order placed for hinged knee brace, administered in the office today, to be worn as needed for activity  · Order placed today for patient to initiate outpatient PT for right knee  · Note provided today for patient to return to work with no limitations  · Follow up in 6 weeks for re-evaluation    To Do Next Visit:  Re-evaluation, consider repeat CSI, VISCO    _____________________________________________________  CHIEF COMPLAINT:  Chief Complaint   Patient presents with   • Right Knee - Pain         SUBJECTIVE:  Libby Taylor is a 67 y o  female who presents for initial evaluation of right knee pain that began approximately 3 years ago with no known mechanism of injury or trauma  On 2/20/2023, she noted a significant increase in swelling and pain which was limiting her ability to fully weight-bear on the right lower extremity, due to concern for a lower extremity blood clot she presented to the emergency department for evaluation where x-rays were taken, a vascular study was performed with no remarkable results, and she was referred to follow-up with orthopedics for further evaluation and treatment  She was also prescribed a prednisone taper which she has completed and states it did not relieve her pain  Today, she denies past injury or surgery, paresthesias, radiating pain, mechanical symptoms of the knee    Reports intermittent sensations of instability  She describes an achy pain that is aggravated with rising from a seated position, weightbearing after prolonged rest, ascending and descending stairs  States pain is mostly on the medial aspect of her knee, occasionally lateral and anterior aspects as well  She states she has not used topical Voltaren gel, however found relief with Vicks application  Also notes 3 years ago having a Baker's cyst aspirated that has not recurred  She reports a total of 3 or 4 cortisone injections to the right knee in the past which did provide her relief of her pain and symptoms  Most recent injections were approximately 3 years ago  Has not completed physical therapy for knee pain  PAST MEDICAL HISTORY:  Past Medical History:   Diagnosis Date   • SVT (supraventricular tachycardia) ([facility] [address] )        PAST SURGICAL HISTORY:  Past Surgical History:   Procedure Laterality Date   • CATARACT EXTRACTION, BILATERAL     • RETINAL DETACHMENT SURGERY         FAMILY HISTORY:  History reviewed  No pertinent family history      SOCIAL HISTORY:  Social History     Tobacco Use   • Smoking status: Never   • Smokeless tobacco: Never   Vaping Use   • Vaping Use: Never used   Substance Use Topics   • Alcohol use: Yes     Comment: social   • Drug use: No       MEDICATIONS:    Current Outpatient Medications:   •  albuterol (PROVENTIL HFA,VENTOLIN HFA) 90 mcg/act inhaler, Inhale 2 puffs, Disp: , Rfl:   •  ascorbic acid (VITAMIN C) 1000 MG tablet, Take 1,000 mg by mouth daily, Disp: , Rfl:   •  atorvastatin (LIPITOR) 20 mg tablet, Take 1 tablet (20 mg total) by mouth daily with dinner (Patient not taking: Reported on 11/19/2022), Disp: 30 tablet, Rfl: 1  •  brimonidine tartrate 0 2 % ophthalmic solution, Apply 1 drop to eye 2 (two) times a day (Patient not taking: Reported on 2/28/2023), Disp: , Rfl:   •  Cholecalciferol 25 MCG (1000 UT) tablet, Take 1,000 Units by mouth daily, Disp: , Rfl:   •  Difluprednate (Durezol) 0 05 % EMUL, Durezol 0 05 % eye drops (Patient not taking: Reported on 2/28/2023), Disp: , Rfl:   •  dorzolamide-timolol (COSOPT) 22 3-6 8 MG/ML ophthalmic solution, , Disp: , Rfl:   •  ketorolac (ACULAR) 0 4 % SOLN, , Disp: , Rfl:   •  ketorolac (ACULAR) 0 5 % ophthalmic solution, , Disp: , Rfl:   •  metoprolol succinate (TOPROL-XL) 25 mg 24 hr tablet, metoprolol succinate ER 25 mg tablet,extended release 24 hr, Disp: , Rfl:   •  omeprazole (PriLOSEC) 20 mg delayed release capsule, omeprazole 20 mg capsule,delayed release, Disp: , Rfl:   •  ondansetron (ZOFRAN-ODT) 4 mg disintegrating tablet, Place 1 tab (4 mg) under your tongue every 6 hours for nausea or vomiting  (Patient not taking: Reported on 2/28/2023), Disp: 12 tablet, Rfl: 0  •  prednisoLONE acetate (PRED FORTE) 1 % ophthalmic suspension, , Disp: , Rfl:   •  predniSONE 20 mg tablet, Take 2 tablets (40 mg total) by mouth daily (Patient not taking: Reported on 2/28/2023), Disp: 10 tablet, Rfl: 0  •  pyridoxine (VITAMIN B6) 50 mg tablet, Take by mouth, Disp: , Rfl:   •  Turmeric 500 MG CAPS, Take 500 mg by mouth daily, Disp: , Rfl:   •  Zinc 50 MG TABS, Take 50 mg by mouth daily, Disp: , Rfl:     ALLERGIES:  Allergies   Allergen Reactions   • Bactrim [Sulfamethoxazole-Trimethoprim] Rash       REVIEW OF SYSTEMS:  MSK: Right knee pain  Neuro: None  Pertinent items are otherwise noted in HPI  A comprehensive review of systems was otherwise negative      LABS:  HgA1c: No results found for: HGBA1C  BMP:   Lab Results   Component Value Date    CALCIUM 9 4 09/29/2020    K 4 1 09/29/2020    CO2 28 09/29/2020     09/29/2020    BUN 14 09/29/2020    CREATININE 0 93 09/29/2020     CBC: No components found for: CBC    _____________________________________________________  PHYSICAL EXAMINATION:  Vital signs: /75   Pulse 56   Ht 5' 2" (1 575 m)   Wt 68 9 kg (151 lb 12 8 oz)   BMI 27 76 kg/m²   General: No acute distress, awake and alert  Psychiatric: Mood and affect appear appropriate  HEENT: Trachea Midline, No torticollis, no apparent facial trauma  Cardiovascular: No audible murmurs; Extremities appear perfused  Pulmonary: No audible wheezing or stridor  Skin: No open lesions; see further details (if any) below    MUSCULOSKELETAL EXAMINATION:  Right Knee:  Minor effusion, no ecchymosis, mild genu varum  Tender with palpation medial joint line, lateral joint line, posteromedial aspect  Range of motion 0-120 with pain and crepitation  5/5 knee flexion, knee extension  Sensation intact in DP/SP/[place]/Sa/T nerve distributions  2+ DP & PT pulses  Brisk capillary refill in all toes        _____________________________________________________  STUDIES REVIEWED:  I personally reviewed the images and interpretation is as follows: Moderate to severe tricompartmental osteoarthritis with loss of medial and patellofemoral joint space, osteophyte formation      PROCEDURES PERFORMED:  Large joint arthrocentesis: R knee  Universal Protocol:  Consent: Verbal consent obtained  Risks and benefits: risks, benefits and alternatives were discussed  Consent given by: patient  Time out: Immediately prior to procedure a "time out" was called to verify the correct patient, procedure, equipment, support staff and site/side marked as required    Patient understanding: patient states understanding of the procedure being performed  Site marked: the operative site was marked  Patient identity confirmed: verbally with patient    Supporting Documentation  Indications: pain and diagnostic evaluation   Procedure Details  Location: knee - R knee  Needle size: 22 G  Medications administered: 2 mL bupivacaine 0 25 %; 12 mg betamethasone acetate-betamethasone sodium phosphate 6 (3-3) mg/mL; 4 mL lidocaine 1 %    Aspirate amount: 5 mL  Aspirate: clear and yellow    Patient tolerance: patient tolerated the procedure well with no immediate complications  Dressing:  Sterile dressing applied          Scribe Attestation    I,:  Jason Whitley am acting as a scribe while in the presence of the attending physician :       I,:  Jessica Galaviz MD personally performed the services described in this documentation    as scribed in my presence :

## 2023-04-04 ENCOUNTER — OFFICE VISIT (OUTPATIENT)
Dept: GASTROENTEROLOGY | Facility: CLINIC | Age: 73
End: 2023-04-04

## 2023-04-04 ENCOUNTER — TELEPHONE (OUTPATIENT)
Dept: GASTROENTEROLOGY | Facility: CLINIC | Age: 73
End: 2023-04-04

## 2023-04-04 VITALS
SYSTOLIC BLOOD PRESSURE: 151 MMHG | DIASTOLIC BLOOD PRESSURE: 74 MMHG | BODY MASS INDEX: 26.46 KG/M2 | HEART RATE: 90 BPM | HEIGHT: 64 IN | WEIGHT: 155 LBS

## 2023-04-04 DIAGNOSIS — K21.00 GASTROESOPHAGEAL REFLUX DISEASE WITH ESOPHAGITIS WITHOUT HEMORRHAGE: Primary | ICD-10-CM

## 2023-04-04 DIAGNOSIS — D12.6 ADENOMATOUS POLYP OF COLON, UNSPECIFIED PART OF COLON: ICD-10-CM

## 2023-04-04 DIAGNOSIS — K57.90 DIVERTICULOSIS: ICD-10-CM

## 2023-04-04 DIAGNOSIS — R13.19 ESOPHAGEAL DYSPHAGIA: ICD-10-CM

## 2023-04-04 NOTE — PROGRESS NOTES
[facility] - Outpatient Consultation  Michael Cedeno 67 y o  female MRN: [id number]  Encounter: [id number]          ASSESSMENT AND PLAN:      1  Gastroesophageal reflux disease with esophagitis without hemorrhage  Patient gives history of significant gastroesophageal reflux disease  She has epigastric discomfort and heartburn  Oftentimes she gets nighttime reflux  There is no history of recent aspirin intake  She has intermittent dysphagia  Denies any nausea or vomiting  She is taking omeprazole 20 mg a day  No other change in her lifestyle  Upper endoscopy will be done for evaluation of worsening reflux and intermittent dysphagia     - EGD; Future    2  Esophageal dysphagia  Intermittent difficulty in swallowing  Food seems to be getting stuck in the mid chest area  There is no vomiting  There is no nausea  - EGD; Future    3  Diverticulosis  Patient has history of diverticulosis  Her last colonoscopy was in 2012  There were no polyps seen at that time  She denies any rectal bleeding or mucus per rectum  A colonoscopy will be done for screening purposes also  She does have history of adenomatous colon polyp     - Colonoscopy; Future    4  Adenomatous polyp of colon, unspecified part of colon  History of adenomatous colon polyp     - Colonoscopy; Future    ______________________________________________________________________    HPI: Heartburn and indigestion with epigastric discomfort  Intermittent dysphagia  No nausea or vomiting  Significant weight loss has not been present  She denies any chest pain, cough or wheezing  She denies any palpitation, orthopnea or exertional dyspnea  She has history of atrial fibrillation however her heart rate appears to be regular at this time  She is following with her cardiologist   She does not take any blood thinner  REVIEW OF SYSTEMS:    CONSTITUTIONAL: Denies any fever, chills, rigors, and weight loss    HEENT: No earache or tinnitus  Denies hearing loss or visual disturbances  CARDIOVASCULAR: No chest pain or palpitations  RESPIRATORY: Denies any cough, hemoptysis, shortness of breath or dyspnea on exertion  GASTROINTESTINAL: As noted in the History of Present Illness  GENITOURINARY: No problems with urination  Denies any hematuria or dysuria  NEUROLOGIC: No dizziness or vertigo, denies headaches  MUSCULOSKELETAL: Denies any muscle or joint pain  SKIN: Denies skin rashes or itching  ENDOCRINE: Denies excessive thirst  Denies intolerance to heat or cold  PSYCHOSOCIAL: Denies depression or anxiety  Denies any recent memory loss  Historical Information   Past Medical History:   Diagnosis Date   • GERD (gastroesophageal reflux disease)    • SVT (supraventricular tachycardia) ([facility])      Past Surgical History:   Procedure Laterality Date   • CATARACT EXTRACTION, BILATERAL     • RETINAL DETACHMENT SURGERY       Social History   Social History     Substance and Sexual Activity   Alcohol Use Yes    Comment: social     Social History     Substance and Sexual Activity   Drug Use No     Social History     Tobacco Use   Smoking Status Never   Smokeless Tobacco Never     History reviewed  No pertinent family history      Meds/Allergies       Current Outpatient Medications:   •  albuterol (PROVENTIL HFA,VENTOLIN HFA) 90 mcg/act inhaler  •  ascorbic acid (VITAMIN C) 1000 MG tablet  •  Cholecalciferol 25 MCG (1000 UT) tablet  •  Difluprednate (Durezol) 0 05 % EMUL  •  dorzolamide-timolol (COSOPT) 22 3-6 8 MG/ML ophthalmic solution  •  ketorolac (ACULAR) 0 4 % SOLN  •  metoprolol succinate (TOPROL-XL) 25 mg 24 hr tablet  •  omeprazole (PriLOSEC) 20 mg delayed release capsule  •  prednisoLONE acetate (PRED FORTE) 1 % ophthalmic suspension  •  pyridoxine (VITAMIN B6) 50 mg tablet  •  Turmeric 500 MG CAPS  •  Zinc 50 MG TABS  •  atorvastatin (LIPITOR) 20 mg tablet  •  brimonidine tartrate 0 2 % ophthalmic solution  • "ketorolac (ACULAR) 0 5 % ophthalmic solution  •  ondansetron (ZOFRAN-ODT) 4 mg disintegrating tablet  •  predniSONE 20 mg tablet    Allergies   Allergen Reactions   • Bactrim [Sulfamethoxazole-Trimethoprim] Rash           Objective     Blood pressure 151/74, pulse 90, height 5' 4\" (1 626 m), weight 70 3 kg (155 lb)  Body mass index is 26 61 kg/m²  PHYSICAL EXAM:      General Appearance:   Alert, cooperative, no distress   HEENT:   Normocephalic, atraumatic, anicteric      Neck:  Supple, symmetrical, trachea midline   Lungs:   Clear to auscultation bilaterally; no rales, rhonchi or wheezing; respirations unlabored    Heart[de-identified]   Regular rate and rhythm; no murmur, rub, or gallop  Abdomen:   Soft, non-tender, non-distended; normal bowel sounds; no masses, no organomegaly    Genitalia:   Deferred    Rectal:   Deferred    Extremities:  No cyanosis, clubbing or edema    Pulses:  2+ and symmetric    Skin:  No jaundice, rashes, or lesions    Lymph nodes:  No palpable cervical lymphadenopathy        Lab Results:   No visits with results within 1 Day(s) from this visit     Latest known visit with results is:   Admission on 09/29/2020, Discharged on 09/30/2020   Component Date Value   • WBC 09/29/2020 6 90    • RBC 09/29/2020 4 69    • Hemoglobin 09/29/2020 13 8    • Hematocrit 09/29/2020 41 9    • MCV 09/29/2020 89    • MCH 09/29/2020 29 4    • MCHC 09/29/2020 32 9    • RDW 09/29/2020 12 8    • MPV 09/29/2020 9 4    • Platelets 82/36/2026 230    • nRBC 09/29/2020 0    • Neutrophils Relative 09/29/2020 62    • Immat GRANS % 09/29/2020 0    • Lymphocytes Relative 09/29/2020 25    • Monocytes Relative 09/29/2020 8    • Eosinophils Relative 09/29/2020 4    • Basophils Relative 09/29/2020 1    • Neutrophils Absolute 09/29/2020 4 26    • Immature Grans Absolute 09/29/2020 0 02    • Lymphocytes Absolute 09/29/2020 1 75    • Monocytes Absolute 09/29/2020 0 56    • Eosinophils Absolute 09/29/2020 0 25    • Basophils Absolute " 09/29/2020 0 06    • Sodium 09/29/2020 138    • Potassium 09/29/2020 4 1    • Chloride 09/29/2020 103    • CO2 09/29/2020 28    • ANION GAP 09/29/2020 7    • BUN 09/29/2020 14    • Creatinine 09/29/2020 0 93    • Glucose 09/29/2020 113    • Calcium 09/29/2020 9 4    • AST 09/29/2020 15    • ALT 09/29/2020 22    • Alkaline Phosphatase 09/29/2020 57    • Total Protein 09/29/2020 7 4    • Albumin 09/29/2020 3 5    • Total Bilirubin 09/29/2020 0 90    • eGFR 09/29/2020 62    • TSH 3RD GENERATON 09/29/2020 0 526    • Magnesium 09/29/2020 1 8    • Troponin I 09/29/2020 0 08 (H)    • Troponin I 09/29/2020 0 03    • Platelets 38/52/0231 239    • MPV 09/29/2020 9 3    • Ventricular Rate 09/29/2020 75    • Atrial Rate 09/29/2020 75    • MN Interval 09/29/2020 138    • QRSD Interval 09/29/2020 74    • QT Interval 09/29/2020 388    • QTC Interval 09/29/2020 433    • P Axis 09/29/2020 67    • QRS Axis 09/29/2020 50    • T Wave Axis 09/29/2020 37    • Troponin I 09/29/2020 0 06 (H)    • Troponin I 09/29/2020 0 04    • Cholesterol 09/29/2020 224 (H)    • Triglycerides 09/29/2020 113    • HDL, Direct 09/29/2020 72    • LDL Calculated 09/29/2020 129 (H)          Radiology Results:   No results found

## 2023-04-04 NOTE — TELEPHONE ENCOUNTER
Scheduled date of EGD/colonoscopy (as of today): 6/5/23  Physician performing EGD/colonoscopy: Dr Angel Left  Location of EGD/colonoscopy: [facility]  Desired bowel prep reviewed with patient: miralax w/ dul given at appt  Instructions reviewed with patient by: enma  Clearances: n/a

## 2023-04-04 NOTE — TELEPHONE ENCOUNTER
"Earle Rod[facility] 27 Assessment    Name: Juan Alvarez  YOB: 1950  Last Height: 5' 4\" (1 626 m)  Last weight: 70 3 kg (155 lb)  BMI: 26 61 kg/m²  Procedure: Colon/EGD  Diagnosis: see orders  Date of procedure: 6/5/23  Prep: miralax w/ dul given  Responsible : yes  Phone#: [phone]  Name completing form: Magenritrae Martinez  Date form completed: 04/04/23      If the patient answers yes to any of these questions, schedule in a hospital  Are you pregnant: No  Do you rely on a wheelchair for mobility: No  Have you been diagnosed with End Stage Renal Disease (ESRD): No  Do you need oxygen during the day: No  Have you had a heart attack or stroke within the past three months: No  Have you had a seizure within the past three months: No  Have you ever been informed by anesthesia that you have a difficult airway: No  Additional Questions  Have you had any cardiac testing or are under the care of a Cardiologist (see cardiac list): No  Cardiac list:   Do you have an implanted cardiac defibrillator: No (Comment:  This patient should be scheduled in the hospital)    Have any bleeding problems, such as anemia or hemophilia (If patient has H&H result below 8, schedule in hospital   H&H must be within 30 days of procedure): No    Had an organ transplant within the past 3 months: No    Do you have any present infections: No  Do you get short of breath when walking a few blocks: No  Have you been diagnosed with diabetes: No  Comments (provide cardiac provider information if applicable): sees cardiologist, however, answered no to cardiac list       "

## 2023-08-24 ENCOUNTER — ANESTHESIA EVENT (OUTPATIENT)
Dept: GASTROENTEROLOGY | Facility: AMBULATORY SURGERY CENTER | Age: 73
End: 2023-08-24

## 2023-08-24 ENCOUNTER — ANESTHESIA (OUTPATIENT)
Dept: GASTROENTEROLOGY | Facility: AMBULATORY SURGERY CENTER | Age: 73
End: 2023-08-24

## 2023-08-24 ENCOUNTER — HOSPITAL ENCOUNTER (OUTPATIENT)
Dept: GASTROENTEROLOGY | Facility: AMBULATORY SURGERY CENTER | Age: 73
Discharge: HOME | End: 2023-08-24
Payer: COMMERCIAL

## 2023-08-24 VITALS
SYSTOLIC BLOOD PRESSURE: 99 MMHG | DIASTOLIC BLOOD PRESSURE: 55 MMHG | OXYGEN SATURATION: 98 % | WEIGHT: 155 LBS | HEART RATE: 64 BPM | RESPIRATION RATE: 18 BRPM | HEIGHT: 64 IN | BODY MASS INDEX: 26.46 KG/M2 | TEMPERATURE: 98.3 F

## 2023-08-24 DIAGNOSIS — R13.19 ESOPHAGEAL DYSPHAGIA: ICD-10-CM

## 2023-08-24 DIAGNOSIS — K21.00 GASTROESOPHAGEAL REFLUX DISEASE WITH ESOPHAGITIS WITHOUT HEMORRHAGE: ICD-10-CM

## 2023-08-24 DIAGNOSIS — K57.90 DIVERTICULOSIS: ICD-10-CM

## 2023-08-24 DIAGNOSIS — D12.6 ADENOMATOUS POLYP OF COLON, UNSPECIFIED PART OF COLON: ICD-10-CM

## 2023-08-24 PROCEDURE — G0105 COLORECTAL SCRN; HI RISK IND: HCPCS | Performed by: INTERNAL MEDICINE

## 2023-08-24 PROCEDURE — 43239 EGD BIOPSY SINGLE/MULTIPLE: CPT | Performed by: INTERNAL MEDICINE

## 2023-08-24 PROCEDURE — 88305 TISSUE EXAM BY PATHOLOGIST: CPT | Performed by: PATHOLOGY

## 2023-08-24 RX ORDER — PROPOFOL 10 MG/ML
INJECTION, EMULSION INTRAVENOUS AS NEEDED
Status: DISCONTINUED | OUTPATIENT
Start: 2023-08-24 | End: 2023-08-24

## 2023-08-24 RX ORDER — PANTOPRAZOLE SODIUM 40 MG/1
40 TABLET, DELAYED RELEASE ORAL 2 TIMES DAILY
Qty: 120 TABLET | Refills: 0 | Status: SHIPPED | OUTPATIENT
Start: 2023-08-24

## 2023-08-24 RX ORDER — SODIUM CHLORIDE, SODIUM LACTATE, POTASSIUM CHLORIDE, CALCIUM CHLORIDE 600; 310; 30; 20 MG/100ML; MG/100ML; MG/100ML; MG/100ML
125 INJECTION, SOLUTION INTRAVENOUS CONTINUOUS
Status: DISCONTINUED | OUTPATIENT
Start: 2023-08-24 | End: 2023-08-28 | Stop reason: HOSPADM

## 2023-08-24 RX ORDER — SODIUM CHLORIDE, SODIUM LACTATE, POTASSIUM CHLORIDE, CALCIUM CHLORIDE 600; 310; 30; 20 MG/100ML; MG/100ML; MG/100ML; MG/100ML
INJECTION, SOLUTION INTRAVENOUS CONTINUOUS PRN
Status: DISCONTINUED | OUTPATIENT
Start: 2023-08-24 | End: 2023-08-24

## 2023-08-24 RX ADMIN — PROPOFOL 100 MG: 10 INJECTION, EMULSION INTRAVENOUS at 07:55

## 2023-08-24 RX ADMIN — PROPOFOL 100 MG: 10 INJECTION, EMULSION INTRAVENOUS at 07:42

## 2023-08-24 RX ADMIN — SODIUM CHLORIDE, SODIUM LACTATE, POTASSIUM CHLORIDE, CALCIUM CHLORIDE: 600; 310; 30; 20 INJECTION, SOLUTION INTRAVENOUS at 06:53

## 2023-08-24 RX ADMIN — PROPOFOL 50 MG: 10 INJECTION, EMULSION INTRAVENOUS at 07:47

## 2023-08-24 RX ADMIN — PROPOFOL 50 MG: 10 INJECTION, EMULSION INTRAVENOUS at 08:03

## 2023-08-24 RX ADMIN — SODIUM CHLORIDE, SODIUM LACTATE, POTASSIUM CHLORIDE, CALCIUM CHLORIDE: 600; 310; 30; 20 INJECTION, SOLUTION INTRAVENOUS at 08:03

## 2023-08-24 RX ADMIN — PROPOFOL 100 MG: 10 INJECTION, EMULSION INTRAVENOUS at 07:37

## 2023-08-24 NOTE — ANESTHESIA PREPROCEDURE EVALUATION
Procedure:  COLONOSCOPY  EGD    Relevant Problems   ANESTHESIA (within normal limits)      CARDIO  Echo: good LV and valves   (+) SVT (supraventricular tachycardia) (HCC)      GI/HEPATIC   (+) GERD (gastroesophageal reflux disease)      MUSCULOSKELETAL   (+) Arthritis   (+) Primary osteoarthritis of right knee      NEURO/PSYCH   (+) Anxiety      PULMONARY (within normal limits)      Other   (+) Diverticulitis   (+) Dizziness        Physical Exam    Airway    Mallampati score: II    Neck ROM: full     Dental       Cardiovascular      Pulmonary      Other Findings  Upper partial, lower bridge      Anesthesia Plan  ASA Score- 2     Anesthesia Type- IV sedation with anesthesia with ASA Monitors. Additional Monitors:   Airway Plan:           Plan Factors-Exercise tolerance (METS): >4 METS. Chart reviewed. EKG reviewed. Existing labs reviewed. Patient summary reviewed. Patient is not a current smoker. Induction-     Postoperative Plan-     Informed Consent- Anesthetic plan and risks discussed with patient. I personally reviewed this patient with the CRNA. Discussed and agreed on the Anesthesia Plan with the CRNA. Cristian Jacob

## 2023-08-24 NOTE — DISCHARGE SUMMARY
Discharge Summary - Elida Holguin 67 y.o. female MRN: [id number]    Unit/Bed#:  Encounter: [id number]    Admission Date: 8/24/2023    Admitting Diagnosis: Diverticulosis [K57.90]  Adenomatous polyp of colon, unspecified part of colon [D12.6]  Gastroesophageal reflux disease with esophagitis without hemorrhage [K21.00]  Esophageal dysphagia [R13.19]    HPI: History of gastroesophageal reflux disease and dysphagia. Screening colonoscopy    Procedures Performed: No orders of the defined types were placed in this encounter. Summary of Hospital Course: Tolerated procedure well    Significant Findings, Care, Treatment and Services Provided: Large hiatal hernia with severe reflux esophagitis and ulceration noted. Extensive diverticulosis noted. Complications: None    Discharge Diagnosis: See above    Medical Problems     Resolved Problems  Date Reviewed: 8/23/2023   None         Condition at Discharge: good         Discharge instructions/Information to patient and family:   See after visit summary for information provided to patient and family. Provisions for Follow-Up Care:  See after visit summary for information related to follow-up care and any pertinent home health orders.       PCP: Devaughn Colmenares DO    Disposition: Home

## 2023-08-24 NOTE — H&P
History and Physical - [facility]  Geoffrey Mesa 67 y.o. female MRN: [id number]                  HPI: Geoffrey Mesa is a 67y.o. year old female who presents for EGD and colonoscopy. Has history of reflux and intermittent dysphagia. History of colon polyp. REVIEW OF SYSTEMS: Per the HPI, and otherwise unremarkable.     Historical Information   Past Medical History:   Diagnosis Date   • Anxiety    • Arthritis    • Colon polyp    • Diverticulitis    • GERD (gastroesophageal reflux disease)    • Prolapsed uterus    • SVT (supraventricular tachycardia) (HCC)      Past Surgical History:   Procedure Laterality Date   • CATARACT EXTRACTION, BILATERAL     • COLONOSCOPY     • RETINAL DETACHMENT SURGERY       Social History   Social History     Substance and Sexual Activity   Alcohol Use Yes    Comment: social     Social History     Substance and Sexual Activity   Drug Use No     Social History     Tobacco Use   Smoking Status Never   Smokeless Tobacco Never     Family History   Problem Relation Age of Onset   • Cancer Mother        Meds/Allergies       Current Outpatient Medications:   •  albuterol (PROVENTIL HFA,VENTOLIN HFA) 90 mcg/act inhaler  •  ascorbic acid (VITAMIN C) 1000 MG tablet  •  Cholecalciferol 25 MCG (1000 UT) tablet  •  Difluprednate (Durezol) 0.05 % EMUL  •  dorzolamide-timolol (COSOPT) 22.3-6.8 MG/ML ophthalmic solution  •  ketorolac (ACULAR) 0.4 % SOLN  •  metoprolol succinate (TOPROL-XL) 25 mg 24 hr tablet  •  omeprazole (PriLOSEC) 20 mg delayed release capsule  •  prednisoLONE acetate (PRED FORTE) 1 % ophthalmic suspension  •  pyridoxine (VITAMIN B6) 50 mg tablet  •  atorvastatin (LIPITOR) 20 mg tablet  •  brimonidine tartrate 0.2 % ophthalmic solution  •  ketorolac (ACULAR) 0.5 % ophthalmic solution  •  ondansetron (ZOFRAN-ODT) 4 mg disintegrating tablet  •  predniSONE 20 mg tablet  •  Turmeric 500 MG CAPS  •  Zinc 50 MG TABS    Current Facility-Administered Medications:   •  lactated ringers infusion, 125 mL/hr, Intravenous, Continuous    Facility-Administered Medications Ordered in Other Encounters:   •  lactated ringers infusion, , Intravenous, Continuous PRN, New Bag at 08/24/23 8457    Allergies   Allergen Reactions   • Bactrim [Sulfamethoxazole-Trimethoprim] Rash       Objective     /64   Pulse 65 Comment: NSR  Temp 98.3 °F (36.8 °C) (Temporal)   Resp 16   Ht 5' 4" (1.626 m)   Wt 70.3 kg (155 lb)   SpO2 98%   BMI 26.61 kg/m²       PHYSICAL EXAM    Gen: NAD  Head: NCAT  CV: RRR  CHEST: Clear  ABD: soft, NT/ND  EXT: no edema      ASSESSMENT/PLAN:  This is a 67y.o. year old female here for EGD and colonoscopy, and she is stable and optimized for her procedure.

## 2023-08-24 NOTE — ANESTHESIA POSTPROCEDURE EVALUATION
Post-Op Assessment Note    CV Status:  Stable  Pain Score: 0    Pain management: adequate     Mental Status:  Alert and awake   Hydration Status:  Euvolemic   PONV Controlled:  Controlled   Airway Patency:  Patent      Post Op Vitals Reviewed: Yes      Staff: CRNA         No notable events documented.     BP   88/58   Temp  98    Pulse  65   Resp   16   SpO2   99

## 2023-08-24 NOTE — INTERVAL H&P NOTE
H&P reviewed. After examining the patient I find no changes in the patients condition since the H&P had been written.     Vitals:    08/24/23 0702   BP: 143/64   Pulse: 65   Resp: 16   Temp: 98.3 °F (36.8 °C)   SpO2: 98%

## 2023-08-29 PROCEDURE — 88305 TISSUE EXAM BY PATHOLOGIST: CPT | Performed by: PATHOLOGY

## 2023-09-07 ENCOUNTER — TELEPHONE (OUTPATIENT)
Dept: GASTROENTEROLOGY | Facility: CLINIC | Age: 73
End: 2023-09-07

## 2023-09-07 NOTE — TELEPHONE ENCOUNTER
----- Message from [employer] sent at 9/6/2023  8:42 AM EDT -----  Written by Angie Sevilla MD on 9/4/2023  9:37 PM EDT  Seen by patient Renita Lee on 9/5/2023 10:33 AM    Patient aware of results via [employer]. Please call patient to schedule EGD for 8 weeks out. Thank you!

## 2023-10-16 ENCOUNTER — ANESTHESIA (OUTPATIENT)
Dept: ANESTHESIOLOGY | Facility: AMBULATORY SURGERY CENTER | Age: 73
End: 2023-10-16

## 2023-10-16 ENCOUNTER — ANESTHESIA EVENT (OUTPATIENT)
Dept: ANESTHESIOLOGY | Facility: AMBULATORY SURGERY CENTER | Age: 73
End: 2023-10-16

## 2023-10-29 RX ORDER — SODIUM CHLORIDE, SODIUM LACTATE, POTASSIUM CHLORIDE, CALCIUM CHLORIDE 600; 310; 30; 20 MG/100ML; MG/100ML; MG/100ML; MG/100ML
75 INJECTION, SOLUTION INTRAVENOUS CONTINUOUS
Status: CANCELLED | OUTPATIENT
Start: 2023-10-29

## 2023-10-30 ENCOUNTER — HOSPITAL ENCOUNTER (OUTPATIENT)
Dept: GASTROENTEROLOGY | Facility: AMBULATORY SURGERY CENTER | Age: 73
Discharge: HOME | End: 2023-10-30
Payer: COMMERCIAL

## 2023-10-30 ENCOUNTER — ANESTHESIA EVENT (OUTPATIENT)
Dept: GASTROENTEROLOGY | Facility: AMBULATORY SURGERY CENTER | Age: 73
End: 2023-10-30

## 2023-10-30 ENCOUNTER — ANESTHESIA (OUTPATIENT)
Dept: GASTROENTEROLOGY | Facility: AMBULATORY SURGERY CENTER | Age: 73
End: 2023-10-30

## 2023-10-30 VITALS
WEIGHT: 155 LBS | RESPIRATION RATE: 18 BRPM | SYSTOLIC BLOOD PRESSURE: 144 MMHG | DIASTOLIC BLOOD PRESSURE: 72 MMHG | BODY MASS INDEX: 26.46 KG/M2 | TEMPERATURE: 97.7 F | HEIGHT: 64 IN | OXYGEN SATURATION: 95 % | HEART RATE: 70 BPM

## 2023-10-30 DIAGNOSIS — K21.00 GASTROESOPHAGEAL REFLUX DISEASE WITH ESOPHAGITIS WITHOUT HEMORRHAGE: ICD-10-CM

## 2023-10-30 DIAGNOSIS — K22.10 ULCER OF ESOPHAGUS WITHOUT BLEEDING: ICD-10-CM

## 2023-10-30 DIAGNOSIS — K44.9 LARGE HIATAL HERNIA: Primary | ICD-10-CM

## 2023-10-30 DIAGNOSIS — K21.00 GASTROESOPHAGEAL REFLUX DISEASE WITH ESOPHAGITIS, UNSPECIFIED WHETHER HEMORRHAGE: ICD-10-CM

## 2023-10-30 PROCEDURE — 43239 EGD BIOPSY SINGLE/MULTIPLE: CPT | Performed by: INTERNAL MEDICINE

## 2023-10-30 PROCEDURE — 88305 TISSUE EXAM BY PATHOLOGIST: CPT | Performed by: STUDENT IN AN ORGANIZED HEALTH CARE EDUCATION/TRAINING PROGRAM

## 2023-10-30 RX ORDER — SODIUM CHLORIDE, SODIUM LACTATE, POTASSIUM CHLORIDE, CALCIUM CHLORIDE 600; 310; 30; 20 MG/100ML; MG/100ML; MG/100ML; MG/100ML
INJECTION, SOLUTION INTRAVENOUS CONTINUOUS PRN
Status: DISCONTINUED | OUTPATIENT
Start: 2023-10-30 | End: 2023-10-30

## 2023-10-30 RX ORDER — SODIUM CHLORIDE, SODIUM LACTATE, POTASSIUM CHLORIDE, CALCIUM CHLORIDE 600; 310; 30; 20 MG/100ML; MG/100ML; MG/100ML; MG/100ML
75 INJECTION, SOLUTION INTRAVENOUS CONTINUOUS
Status: DISCONTINUED | OUTPATIENT
Start: 2023-10-30 | End: 2023-11-03 | Stop reason: HOSPADM

## 2023-10-30 RX ORDER — LIDOCAINE HYDROCHLORIDE 20 MG/ML
INJECTION, SOLUTION EPIDURAL; INFILTRATION; INTRACAUDAL; PERINEURAL AS NEEDED
Status: DISCONTINUED | OUTPATIENT
Start: 2023-10-30 | End: 2023-10-30

## 2023-10-30 RX ORDER — PROPOFOL 10 MG/ML
INJECTION, EMULSION INTRAVENOUS AS NEEDED
Status: DISCONTINUED | OUTPATIENT
Start: 2023-10-30 | End: 2023-10-30

## 2023-10-30 RX ADMIN — PROPOFOL 50 MG: 10 INJECTION, EMULSION INTRAVENOUS at 10:38

## 2023-10-30 RX ADMIN — PROPOFOL 50 MG: 10 INJECTION, EMULSION INTRAVENOUS at 10:40

## 2023-10-30 RX ADMIN — LIDOCAINE HYDROCHLORIDE 80 MG: 20 INJECTION, SOLUTION EPIDURAL; INFILTRATION; INTRACAUDAL; PERINEURAL at 10:36

## 2023-10-30 RX ADMIN — SODIUM CHLORIDE, SODIUM LACTATE, POTASSIUM CHLORIDE, CALCIUM CHLORIDE: 600; 310; 30; 20 INJECTION, SOLUTION INTRAVENOUS at 10:29

## 2023-10-30 RX ADMIN — PROPOFOL 20 MG: 10 INJECTION, EMULSION INTRAVENOUS at 10:42

## 2023-10-30 RX ADMIN — PROPOFOL 150 MG: 10 INJECTION, EMULSION INTRAVENOUS at 10:36

## 2023-10-30 RX ADMIN — PROPOFOL 20 MG: 10 INJECTION, EMULSION INTRAVENOUS at 10:44

## 2023-10-30 NOTE — ANESTHESIA PREPROCEDURE EVALUATION
Procedure:  EGD    Relevant Problems   CARDIO   (+) SVT (supraventricular tachycardia)      GI/HEPATIC   (+) GERD (gastroesophageal reflux disease)      MUSCULOSKELETAL   (+) Arthritis   (+) Primary osteoarthritis of right knee      NEURO/PSYCH   (+) Anxiety        Physical Exam    Airway    Mallampati score: II  TM Distance: >3 FB  Neck ROM: full     Dental    upper dentures and lower dentures    Cardiovascular  Rhythm: regular, Rate: normal    Pulmonary      Other Findings  Partial lower denture and full upper      Anesthesia Plan  ASA Score- 2     Anesthesia Type- IV sedation with anesthesia with ASA Monitors. Additional Monitors:     Airway Plan:            Plan Factors-    Chart reviewed. Patient is not a current smoker. Induction- intravenous. Postoperative Plan-     Informed Consent- Anesthetic plan and risks discussed with patient. I personally reviewed this patient with the CRNA. Discussed and agreed on the Anesthesia Plan with the CRNA. Meek Walker

## 2023-10-30 NOTE — ANESTHESIA POSTPROCEDURE EVALUATION
Post-Op Assessment Note    CV Status:  Stable  Pain Score: 0    Pain management: adequate     Mental Status:  Sleepy and arousable   PONV Controlled:  None   Airway Patency:  Patent      Post Op Vitals Reviewed: Yes      Staff: CRNA         No notable events documented.     BP  101/60   Temp      Pulse 75   Resp 14   SpO2 96

## 2023-10-30 NOTE — DISCHARGE SUMMARY
Discharge Summary - Milena Simmons 68 y.o. female MRN: [id number]    Unit/Bed#:  Encounter: [id number]    Admission Date: 10/30/2023    Admitting Diagnosis: Gastroesophageal reflux disease with esophagitis, unspecified whether hemorrhage [K21.00]  Ulcer of esophagus without bleeding [K22.10]    HPI: History of persistent reflux symptoms. History of esophageal ulcer. Procedures Performed: No orders of the defined types were placed in this encounter. Summary of Hospital Course: Tolerated procedure well    Significant Findings, Care, Treatment and Services Provided: Large hiatal hernia noted. Previously seen esophageal ulcer has healed. Complications: None    Discharge Diagnosis: See above    Medical Problems       Resolved Problems  Date Reviewed: 8/23/2023   None         Condition at Discharge: good         Discharge instructions/Information to patient and family:   See after visit summary for information provided to patient and family. Provisions for Follow-Up Care:  See after visit summary for information related to follow-up care and any pertinent home health orders.       PCP: Mike Jim DO    Disposition: Home

## 2023-11-07 PROCEDURE — 88305 TISSUE EXAM BY PATHOLOGIST: CPT | Performed by: STUDENT IN AN ORGANIZED HEALTH CARE EDUCATION/TRAINING PROGRAM

## 2023-11-19 DIAGNOSIS — R13.19 ESOPHAGEAL DYSPHAGIA: ICD-10-CM

## 2023-11-19 DIAGNOSIS — K21.00 GASTROESOPHAGEAL REFLUX DISEASE WITH ESOPHAGITIS WITHOUT HEMORRHAGE: ICD-10-CM

## 2023-11-20 RX ORDER — PANTOPRAZOLE SODIUM 40 MG/1
TABLET, DELAYED RELEASE ORAL
Qty: 120 TABLET | Refills: 0 | Status: SHIPPED | OUTPATIENT
Start: 2023-11-20

## 2023-11-22 ENCOUNTER — NURSE TRIAGE (OUTPATIENT)
Age: 73
End: 2023-11-22

## 2023-11-22 DIAGNOSIS — Z01.812 BLOOD TESTS PRIOR TO TREATMENT OR PROCEDURE: Primary | ICD-10-CM

## 2023-11-22 NOTE — TELEPHONE ENCOUNTER
I returned call, no answer, left message that labs need to be completed prior to CT scan and orders placed into [facility] system. Call back if questions to [phone]. Answer Assessment - Initial Assessment Questions  1. REASON FOR CALL or QUESTION: "What is your reason for calling today?" or "How can I best help you?" or "What question do you have that I can help answer?"      Patient requires labs prior to CT with contrast.    Protocols used:  Information Only Call - No Triage-ADULT-[place]

## 2023-11-22 NOTE — TELEPHONE ENCOUNTER
----- Message from Ana Ugarte sent at 11/22/2023  4:23 PM EST -----  Prateek Hall from [facility] radiology called in stating that the pt needs labs ordered for her ct scan on 12/01/23. Prateek Hall asked that we order a bun and creatinine for pt and call the pt to let her know.

## 2023-11-28 ENCOUNTER — APPOINTMENT (OUTPATIENT)
Dept: LAB | Facility: HOSPITAL | Age: 73
End: 2023-11-28
Payer: COMMERCIAL

## 2023-11-28 DIAGNOSIS — Z01.812 BLOOD TESTS PRIOR TO TREATMENT OR PROCEDURE: ICD-10-CM

## 2023-11-28 LAB
BUN SERPL-MCNC: 17 MG/DL (ref 5–25)
CREAT SERPL-MCNC: 0.94 MG/DL (ref 0.6–1.3)
GFR SERPL CREATININE-BSD FRML MDRD: 60 ML/MIN/1.73SQ M

## 2023-11-28 PROCEDURE — 82565 ASSAY OF CREATININE: CPT

## 2023-11-28 PROCEDURE — 36415 COLL VENOUS BLD VENIPUNCTURE: CPT

## 2023-11-28 PROCEDURE — 84520 ASSAY OF UREA NITROGEN: CPT

## 2023-12-01 ENCOUNTER — HOSPITAL ENCOUNTER (OUTPATIENT)
Dept: CT IMAGING | Facility: HOSPITAL | Age: 73
End: 2023-12-01
Payer: COMMERCIAL

## 2023-12-01 DIAGNOSIS — K21.00 GASTROESOPHAGEAL REFLUX DISEASE WITH ESOPHAGITIS, UNSPECIFIED WHETHER HEMORRHAGE: ICD-10-CM

## 2023-12-01 DIAGNOSIS — K44.9 LARGE HIATAL HERNIA: ICD-10-CM

## 2023-12-01 PROCEDURE — 74177 CT ABD & PELVIS W/CONTRAST: CPT

## 2023-12-01 PROCEDURE — G1004 CDSM NDSC: HCPCS

## 2023-12-01 PROCEDURE — 71260 CT THORAX DX C+: CPT

## 2023-12-01 RX ADMIN — IOHEXOL 100 ML: 350 INJECTION, SOLUTION INTRAVENOUS at 07:36

## 2024-01-12 ENCOUNTER — CONSULT (OUTPATIENT)
Dept: SURGERY | Facility: CLINIC | Age: 74
End: 2024-01-12
Payer: COMMERCIAL

## 2024-01-12 VITALS
DIASTOLIC BLOOD PRESSURE: 80 MMHG | BODY MASS INDEX: 26.91 KG/M2 | TEMPERATURE: 96.7 F | OXYGEN SATURATION: 97 % | WEIGHT: 157.6 LBS | RESPIRATION RATE: 14 BRPM | HEART RATE: 77 BPM | HEIGHT: 64 IN | SYSTOLIC BLOOD PRESSURE: 166 MMHG

## 2024-01-12 DIAGNOSIS — K44.9 LARGE HIATAL HERNIA: Primary | ICD-10-CM

## 2024-01-12 DIAGNOSIS — K21.00 GASTROESOPHAGEAL REFLUX DISEASE WITH ESOPHAGITIS WITHOUT HEMORRHAGE: ICD-10-CM

## 2024-01-12 PROCEDURE — 99204 OFFICE O/P NEW MOD 45 MIN: CPT | Performed by: SURGERY

## 2024-01-12 NOTE — ASSESSMENT & PLAN NOTE
73-year-old female presents with a hiatal hernia, as well as reflux and a previous esophageal ulcer.    Plan:  Upon review of both her upper endoscopy and CT scan she has a moderate to large type III paraesophageal hernia.  She is moderately symptomatic, and this hernia has been increasing in size over time.  We discussed at length the pathophysiology of reflux, as well as hiatal hernias.    Given her intermittent episodes of dysphagia, albeit improved on Protonix, I would like for her to obtain esophageal manometry to rule out a motility disorder.  Following this she can return to clinic and we can discuss surgical management moving forward.    She will need cardiac risk stratification for her history of SVT prior to any operative intervention.

## 2024-01-12 NOTE — PROGRESS NOTES
Office Visit - General Surgery  Ileana Paz MRN: [id number]  Encounter: [id number]  Assessment/Plan    Problem List Items Addressed This Visit          Digestive    GERD (gastroesophageal reflux disease)    Large hiatal hernia - Primary     73-year-old female presents with a hiatal hernia, as well as reflux and a previous esophageal ulcer.    Plan:  Upon review of both her upper endoscopy and CT scan she has a moderate to large type III paraesophageal hernia.  She is moderately symptomatic, and this hernia has been increasing in size over time.  We discussed at length the pathophysiology of reflux, as well as hiatal hernias.    Given her intermittent episodes of dysphagia, albeit improved on Protonix, I would like for her to obtain esophageal manometry to rule out a motility disorder.  Following this she can return to clinic and we can discuss surgical management moving forward.    She will need cardiac risk stratification for her history of SVT prior to any operative intervention.         Relevant Orders    Esophageal manometry       Subjective    Ileana Paz is a 73 y.o. female who presents for a hiatal hernia.  She has approximately 10-year history of reflux disease that has worsened over the past year.  She has a history of an esophageal ulcer and was being managed by Dr. Myers, her gastroenterologist with serial endoscopies.  Her last endoscopy from 10/30/2023 which I reviewed in PACS shows a healed esophageal ulcer.  However the study was notable for a large hiatal hernia.  She subsequently underwent CT scan of the abdomen and pelvis on 12/1/2023 which I reviewed in PACS which is consistent with a large type III paraesophageal hernia.Currently, she feels well and is without complaints.  In the past she had been intermittent with her PPI dosing, however recently she has been sticking on once daily PPI, which has eliminated her reflux symptoms, and most of her dysphagia symptoms.  She denies  any fevers, chills, chest pain, shortness of breath.  I reviewed the most recent notes in the chart from Dr. Myers on 10/30/23.      Pt  has a past medical history of Anxiety, Arthritis, Colon polyp, Diverticulitis, GERD (gastroesophageal reflux disease), Prolapsed uterus, and SVT (supraventricular tachycardia).    Pt  has a past surgical history that includes Retinal detachment surgery; Cataract extraction, bilateral; Colonoscopy; and EGD.    family history includes Brain cancer in her father; Cancer in her mother; Colon cancer in her mother.    Ileana Paz reports that she has never smoked. She has never used smokeless tobacco. She reports current alcohol use of about 4.0 standard drinks of alcohol per week. She reports that she does not use drugs.      Current Outpatient Medications on File Prior to Visit   Medication Sig Dispense Refill    albuterol (PROVENTIL HFA,VENTOLIN HFA) 90 mcg/act inhaler Inhale 2 puffs      ascorbic acid (VITAMIN C) 1000 MG tablet Take 1,000 mg by mouth daily      Cholecalciferol 25 MCG (1000 UT) tablet Take 1,000 Units by mouth daily      dorzolamide-timolol (COSOPT) 22.3-6.8 MG/ML ophthalmic solution       ketorolac (ACULAR) 0.4 % SOLN       metoprolol succinate (TOPROL-XL) 25 mg 24 hr tablet metoprolol succinate ER 25 mg tablet,extended release 24 hr      omeprazole (PriLOSEC) 20 mg delayed release capsule omeprazole 20 mg capsule,delayed release      pantoprazole (PROTONIX) 40 mg tablet TAKE ONE TABLET BY MOUTH TWICE A DAY.TAKE 1 TABLET 1/2 HOUR BEFORE BREAKFAST AND DINNER. 120 tablet 0    prednisoLONE acetate (PRED FORTE) 1 % ophthalmic suspension       pyridoxine (VITAMIN B6) 50 mg tablet Take by mouth      Turmeric 500 MG CAPS Take 500 mg by mouth daily      Zinc 50 MG TABS Take 50 mg by mouth daily       No current facility-administered medications on file prior to visit.     Allergies   Allergen Reactions    Bactrim [Sulfamethoxazole-Trimethoprim] Rash       Review  of Systems   Constitutional:  Negative for chills, fatigue and fever.   HENT:  Negative for ear pain, facial swelling, sinus pressure and sinus pain.    Eyes:  Negative for pain.   Respiratory:  Negative for cough, shortness of breath and wheezing.    Cardiovascular:  Negative for chest pain.   Gastrointestinal:  Negative for abdominal pain, constipation, diarrhea, nausea and vomiting.        Heartburn, dysphagia.    Endocrine: Negative for cold intolerance and heat intolerance.   Genitourinary:  Negative for dysuria and flank pain.   Musculoskeletal:  Negative for back pain and neck pain.   Skin:  Negative for wound.   Neurological:  Negative for syncope, facial asymmetry, light-headedness and numbness.   Psychiatric/Behavioral:  Negative for behavioral problems, confusion and suicidal ideas.        Objective    Vitals:    01/12/24 1350   BP: 166/80   Pulse: 77   Resp: 14   Temp: (!) 96.7 °F (35.9 °C)   SpO2: 97%       Physical Exam  Vitals and nursing note reviewed.   Constitutional:       General: She is not in acute distress.     Appearance: Normal appearance. She is not toxic-appearing.   HENT:      Head: Normocephalic and atraumatic.      Mouth/Throat:      Mouth: Mucous membranes are moist.   Eyes:      Extraocular Movements: Extraocular movements intact.      Pupils: Pupils are equal, round, and reactive to light.   Cardiovascular:      Rate and Rhythm: Normal rate and regular rhythm.      Pulses: Normal pulses.   Pulmonary:      Effort: Pulmonary effort is normal. No respiratory distress.      Breath sounds: Normal breath sounds. No wheezing.   Abdominal:      General: There is no distension.      Palpations: Abdomen is soft. There is no mass.      Tenderness: There is no abdominal tenderness. There is no guarding or rebound.      Hernia: No hernia is present.   Musculoskeletal:         General: No swelling or deformity. Normal range of motion.      Cervical back: Normal range of motion and neck supple.       Right lower leg: No edema.      Left lower leg: No edema.   Skin:     General: Skin is warm and dry.      Coloration: Skin is not jaundiced.   Neurological:      General: No focal deficit present.      Mental Status: She is alert and oriented to person, place, and time.   Psychiatric:         Mood and Affect: Mood normal.         Behavior: Behavior normal.

## 2024-03-18 ENCOUNTER — APPOINTMENT (EMERGENCY)
Dept: RADIOLOGY | Facility: HOSPITAL | Age: 74
End: 2024-03-18
Payer: COMMERCIAL

## 2024-03-18 ENCOUNTER — HOSPITAL ENCOUNTER (EMERGENCY)
Facility: HOSPITAL | Age: 74
Discharge: HOME | End: 2024-03-18
Attending: EMERGENCY MEDICINE | Admitting: EMERGENCY MEDICINE
Payer: COMMERCIAL

## 2024-03-18 ENCOUNTER — APPOINTMENT (EMERGENCY)
Dept: RADIOLOGY | Facility: HOSPITAL | Age: 74
End: 2024-03-18
Attending: EMERGENCY MEDICINE
Payer: COMMERCIAL

## 2024-03-18 VITALS
HEART RATE: 66 BPM | SYSTOLIC BLOOD PRESSURE: 165 MMHG | RESPIRATION RATE: 18 BRPM | TEMPERATURE: 97.7 F | OXYGEN SATURATION: 98 % | DIASTOLIC BLOOD PRESSURE: 74 MMHG

## 2024-03-18 DIAGNOSIS — R10.11 RUQ ABDOMINAL PAIN: Primary | ICD-10-CM

## 2024-03-18 LAB
ALBUMIN SERPL BCP-MCNC: 4.2 G/DL (ref 3.5–5)
ALP SERPL-CCNC: 60 U/L (ref 34–104)
ALT SERPL W P-5'-P-CCNC: 12 U/L (ref 7–52)
ANION GAP SERPL CALCULATED.3IONS-SCNC: 8 MMOL/L (ref 4–13)
AST SERPL W P-5'-P-CCNC: 16 U/L (ref 13–39)
BACTERIA UR QL AUTO: NORMAL /HPF
BASOPHILS # BLD AUTO: 0.06 THOUSANDS/ÂΜL (ref 0–0.1)
BASOPHILS NFR BLD AUTO: 1 % (ref 0–1)
BILIRUB DIRECT SERPL-MCNC: 0.17 MG/DL (ref 0–0.2)
BILIRUB SERPL-MCNC: 1.19 MG/DL (ref 0.2–1)
BILIRUB UR QL STRIP: NEGATIVE
BUN SERPL-MCNC: 14 MG/DL (ref 5–25)
CALCIUM SERPL-MCNC: 9.5 MG/DL (ref 8.4–10.2)
CHLORIDE SERPL-SCNC: 104 MMOL/L (ref 96–108)
CLARITY UR: CLEAR
CO2 SERPL-SCNC: 26 MMOL/L (ref 21–32)
COLOR UR: YELLOW
CREAT SERPL-MCNC: 0.8 MG/DL (ref 0.6–1.3)
EOSINOPHIL # BLD AUTO: 0.18 THOUSAND/ÂΜL (ref 0–0.61)
EOSINOPHIL NFR BLD AUTO: 4 % (ref 0–6)
ERYTHROCYTE [DISTWIDTH] IN BLOOD BY AUTOMATED COUNT: 12.6 % (ref 11.6–15.1)
GFR SERPL CREATININE-BSD FRML MDRD: 73 ML/MIN/1.73SQ M
GLUCOSE SERPL-MCNC: 91 MG/DL (ref 65–140)
GLUCOSE UR STRIP-MCNC: NEGATIVE MG/DL
HCT VFR BLD AUTO: 41.2 % (ref 34.8–46.1)
HGB BLD-MCNC: 13.7 G/DL (ref 11.5–15.4)
HGB UR QL STRIP.AUTO: ABNORMAL
IMM GRANULOCYTES # BLD AUTO: 0.01 THOUSAND/UL (ref 0–0.2)
IMM GRANULOCYTES NFR BLD AUTO: 0 % (ref 0–2)
KETONES UR STRIP-MCNC: ABNORMAL MG/DL
LEUKOCYTE ESTERASE UR QL STRIP: NEGATIVE
LIPASE SERPL-CCNC: 35 U/L (ref 11–82)
LYMPHOCYTES # BLD AUTO: 1.9 THOUSANDS/ÂΜL (ref 0.6–4.47)
LYMPHOCYTES NFR BLD AUTO: 38 % (ref 14–44)
MCH RBC QN AUTO: 29.7 PG (ref 26.8–34.3)
MCHC RBC AUTO-ENTMCNC: 33.3 G/DL (ref 31.4–37.4)
MCV RBC AUTO: 89 FL (ref 82–98)
MONOCYTES # BLD AUTO: 0.48 THOUSAND/ÂΜL (ref 0.17–1.22)
MONOCYTES NFR BLD AUTO: 10 % (ref 4–12)
NEUTROPHILS # BLD AUTO: 2.34 THOUSANDS/ÂΜL (ref 1.85–7.62)
NEUTS SEG NFR BLD AUTO: 47 % (ref 43–75)
NITRITE UR QL STRIP: NEGATIVE
NON-SQ EPI CELLS URNS QL MICRO: NORMAL /HPF
NRBC BLD AUTO-RTO: 0 /100 WBCS
PH UR STRIP.AUTO: 5.5 [PH] (ref 4.5–8)
PLATELET # BLD AUTO: 218 THOUSANDS/UL (ref 149–390)
PMV BLD AUTO: 9.6 FL (ref 8.9–12.7)
POTASSIUM SERPL-SCNC: 3.9 MMOL/L (ref 3.5–5.3)
PROT SERPL-MCNC: 7.1 G/DL (ref 6.4–8.4)
PROT UR STRIP-MCNC: NEGATIVE MG/DL
RBC # BLD AUTO: 4.61 MILLION/UL (ref 3.81–5.12)
RBC #/AREA URNS AUTO: NORMAL /HPF
SODIUM SERPL-SCNC: 138 MMOL/L (ref 135–147)
SP GR UR STRIP.AUTO: 1.01 (ref 1–1.03)
UROBILINOGEN UR QL STRIP.AUTO: 0.2 E.U./DL
WBC # BLD AUTO: 4.97 THOUSAND/UL (ref 4.31–10.16)
WBC #/AREA URNS AUTO: NORMAL /HPF

## 2024-03-18 PROCEDURE — 96361 HYDRATE IV INFUSION ADD-ON: CPT

## 2024-03-18 PROCEDURE — 81001 URINALYSIS AUTO W/SCOPE: CPT

## 2024-03-18 PROCEDURE — 74177 CT ABD & PELVIS W/CONTRAST: CPT

## 2024-03-18 PROCEDURE — 93005 ELECTROCARDIOGRAM TRACING: CPT

## 2024-03-18 PROCEDURE — 99285 EMERGENCY DEPT VISIT HI MDM: CPT | Performed by: EMERGENCY MEDICINE

## 2024-03-18 PROCEDURE — 80048 BASIC METABOLIC PNL TOTAL CA: CPT

## 2024-03-18 PROCEDURE — 85025 COMPLETE CBC W/AUTO DIFF WBC: CPT

## 2024-03-18 PROCEDURE — 99284 EMERGENCY DEPT VISIT MOD MDM: CPT

## 2024-03-18 PROCEDURE — 96374 THER/PROPH/DIAG INJ IV PUSH: CPT

## 2024-03-18 PROCEDURE — 83690 ASSAY OF LIPASE: CPT

## 2024-03-18 PROCEDURE — 96375 TX/PRO/DX INJ NEW DRUG ADDON: CPT

## 2024-03-18 PROCEDURE — 80076 HEPATIC FUNCTION PANEL: CPT

## 2024-03-18 PROCEDURE — 36415 COLL VENOUS BLD VENIPUNCTURE: CPT

## 2024-03-18 PROCEDURE — 76705 ECHO EXAM OF ABDOMEN: CPT

## 2024-03-18 RX ORDER — ONDANSETRON 2 MG/ML
4 INJECTION INTRAMUSCULAR; INTRAVENOUS ONCE
Status: COMPLETED | OUTPATIENT
Start: 2024-03-18 | End: 2024-03-18

## 2024-03-18 RX ORDER — SUCRALFATE 1 G/1
1 TABLET ORAL ONCE
Status: COMPLETED | OUTPATIENT
Start: 2024-03-18 | End: 2024-03-18

## 2024-03-18 RX ORDER — MAGNESIUM HYDROXIDE/ALUMINUM HYDROXICE/SIMETHICONE 120; 1200; 1200 MG/30ML; MG/30ML; MG/30ML
30 SUSPENSION ORAL ONCE
Status: COMPLETED | OUTPATIENT
Start: 2024-03-18 | End: 2024-03-18

## 2024-03-18 RX ORDER — ONDANSETRON 4 MG/1
4 TABLET, FILM COATED ORAL EVERY 6 HOURS
Qty: 12 TABLET | Refills: 0 | Status: SHIPPED | OUTPATIENT
Start: 2024-03-18

## 2024-03-18 RX ORDER — MORPHINE SULFATE 4 MG/ML
4 INJECTION, SOLUTION INTRAMUSCULAR; INTRAVENOUS ONCE
Status: COMPLETED | OUTPATIENT
Start: 2024-03-18 | End: 2024-03-18

## 2024-03-18 RX ORDER — KETOROLAC TROMETHAMINE 30 MG/ML
15 INJECTION, SOLUTION INTRAMUSCULAR; INTRAVENOUS ONCE
Status: COMPLETED | OUTPATIENT
Start: 2024-03-18 | End: 2024-03-18

## 2024-03-18 RX ADMIN — SODIUM CHLORIDE 1000 ML: 0.9 INJECTION, SOLUTION INTRAVENOUS at 16:19

## 2024-03-18 RX ADMIN — SUCRALFATE 1 G: 1 TABLET ORAL at 16:31

## 2024-03-18 RX ADMIN — KETOROLAC TROMETHAMINE 15 MG: 30 INJECTION, SOLUTION INTRAMUSCULAR; INTRAVENOUS at 18:27

## 2024-03-18 RX ADMIN — ALUMINUM HYDROXIDE, MAGNESIUM HYDROXIDE, AND DIMETHICONE 30 ML: 200; 20; 200 SUSPENSION ORAL at 16:31

## 2024-03-18 RX ADMIN — ONDANSETRON 4 MG: 2 INJECTION INTRAMUSCULAR; INTRAVENOUS at 16:34

## 2024-03-18 RX ADMIN — MORPHINE SULFATE 4 MG: 4 INJECTION INTRAVENOUS at 18:27

## 2024-03-18 RX ADMIN — IOHEXOL 100 ML: 350 INJECTION, SOLUTION INTRAVENOUS at 17:25

## 2024-03-18 NOTE — DISCHARGE INSTRUCTIONS
Please take medications as prescribed.     Follow up with GI as needed. Return to the ED with any new/concerning issues.

## 2024-03-18 NOTE — ED ATTENDING ATTESTATION
"I, Alejandro Conley MD, saw and evaluated the patient. I have discussed the patient with the resident and agree with the resident's findings, Plan of Care, and MDM as documented in the resident's note, except where noted. All available labs and Radiology studies were reviewed.  I was present for key portions of any procedure(s) performed by the resident and I was immediately available to provide assistance.    At this point I agree with the current assessment done in the Emergency Department.  I have conducted an independent evaluation of this patient a history and physical is as follows:    74 yo female with a history of SVT, GERD, anxiety, and diverticulitis presents to the ED for evaluation of abdominal pain x 3 days. The patient reports an intermittent \"numbing\" pain in the right upper abdomen and epigastric area. (+) Nausea but no vomiting. No dysuria, hematuria, or diarrhea. She denies lower abdominal and pelvic pain. No vaginal bleeding or discharge. No back or flank pain. Pain is unrelated to PO intake but is worsened with prolonged sitting. No fevers or chills. No other specific complaints.    ROS: per resident physician note    Gen: NAD, AA&Ox3  HEENT: PERRL, EOMI  Neck: supple  CV: RRR  Lungs: CTA B/L  Abdomen: soft, (+) moderate epigastric and RUQ tenderness, no rebound or guarding  Back: no CVA tenderness  Ext: no swelling or deformity  Neuro: 5/5 strength all extremities, sensation grossly intact  Skin: no rash    ED Course  The patient is comfortable appearing with stable vital signs. (+) RUQ and epigastric tenderness on exam. Unclear etiology of symptoms. Cholecystitis vs cholelithiasis vs pancreatitis vs gastritis vs hepatitis? Will check basic labs, lipase, and CT A/P. IVFs, Zofran, Maalox, and Carafate administered. Will continue to monitor in the ED. Disposition per workup and reassessment.      Critical Care Time  Procedures   "

## 2024-03-19 DIAGNOSIS — R13.19 ESOPHAGEAL DYSPHAGIA: ICD-10-CM

## 2024-03-19 DIAGNOSIS — K21.00 GASTROESOPHAGEAL REFLUX DISEASE WITH ESOPHAGITIS WITHOUT HEMORRHAGE: ICD-10-CM

## 2024-03-19 LAB
ATRIAL RATE: 68 BPM
P AXIS: 102 DEGREES
PR INTERVAL: 146 MS
QRS AXIS: 128 DEGREES
QRSD INTERVAL: 68 MS
QT INTERVAL: 386 MS
QTC INTERVAL: 410 MS
T WAVE AXIS: 107 DEGREES
VENTRICULAR RATE: 68 BPM

## 2024-03-19 PROCEDURE — 93010 ELECTROCARDIOGRAM REPORT: CPT | Performed by: INTERNAL MEDICINE

## 2024-03-19 RX ORDER — PANTOPRAZOLE SODIUM 40 MG/1
TABLET, DELAYED RELEASE ORAL
Qty: 120 TABLET | Refills: 1 | Status: SHIPPED | OUTPATIENT
Start: 2024-03-19

## 2024-03-21 ENCOUNTER — OFFICE VISIT (OUTPATIENT)
Dept: GASTROENTEROLOGY | Facility: CLINIC | Age: 74
End: 2024-03-21
Payer: COMMERCIAL

## 2024-03-21 ENCOUNTER — OFFICE VISIT (OUTPATIENT)
Dept: OBGYN CLINIC | Facility: CLINIC | Age: 74
End: 2024-03-21
Payer: COMMERCIAL

## 2024-03-21 VITALS
HEART RATE: 58 BPM | BODY MASS INDEX: 27.49 KG/M2 | RESPIRATION RATE: 16 BRPM | SYSTOLIC BLOOD PRESSURE: 154 MMHG | DIASTOLIC BLOOD PRESSURE: 82 MMHG | TEMPERATURE: 98.2 F | HEIGHT: 64 IN | WEIGHT: 161 LBS | OXYGEN SATURATION: 98 %

## 2024-03-21 VITALS
HEART RATE: 81 BPM | HEIGHT: 64 IN | BODY MASS INDEX: 27.49 KG/M2 | SYSTOLIC BLOOD PRESSURE: 144 MMHG | DIASTOLIC BLOOD PRESSURE: 78 MMHG | WEIGHT: 161 LBS

## 2024-03-21 DIAGNOSIS — R17 SERUM TOTAL BILIRUBIN ELEVATED: ICD-10-CM

## 2024-03-21 DIAGNOSIS — K44.9 LARGE HIATAL HERNIA: ICD-10-CM

## 2024-03-21 DIAGNOSIS — R11.0 NAUSEA: ICD-10-CM

## 2024-03-21 DIAGNOSIS — M19.011 GLENOHUMERAL ARTHRITIS, RIGHT: Primary | ICD-10-CM

## 2024-03-21 DIAGNOSIS — K21.00 GASTROESOPHAGEAL REFLUX DISEASE WITH ESOPHAGITIS, UNSPECIFIED WHETHER HEMORRHAGE: ICD-10-CM

## 2024-03-21 DIAGNOSIS — K57.90 DIVERTICULOSIS: ICD-10-CM

## 2024-03-21 DIAGNOSIS — R10.11 RUQ ABDOMINAL PAIN: Primary | ICD-10-CM

## 2024-03-21 DIAGNOSIS — Z12.11 SCREENING FOR COLON CANCER: ICD-10-CM

## 2024-03-21 PROCEDURE — 99204 OFFICE O/P NEW MOD 45 MIN: CPT | Performed by: FAMILY MEDICINE

## 2024-03-21 PROCEDURE — 99213 OFFICE O/P EST LOW 20 MIN: CPT | Performed by: NURSE PRACTITIONER

## 2024-03-21 PROCEDURE — 20611 DRAIN/INJ JOINT/BURSA W/US: CPT | Performed by: FAMILY MEDICINE

## 2024-03-21 RX ORDER — SUCRALFATE 1 G/1
1 TABLET ORAL 4 TIMES DAILY
Qty: 120 TABLET | Refills: 0 | Status: SHIPPED | OUTPATIENT
Start: 2024-03-21

## 2024-03-21 RX ORDER — LIDOCAINE HYDROCHLORIDE 10 MG/ML
4 INJECTION, SOLUTION INFILTRATION; PERINEURAL
Status: COMPLETED | OUTPATIENT
Start: 2024-03-21 | End: 2024-03-21

## 2024-03-21 RX ORDER — ATORVASTATIN CALCIUM 10 MG/1
10 TABLET, FILM COATED ORAL DAILY
COMMUNITY
Start: 2024-03-07 | End: 2025-03-07

## 2024-03-21 RX ORDER — LISINOPRIL 10 MG/1
TABLET ORAL
COMMUNITY
Start: 2024-01-26

## 2024-03-21 RX ORDER — LATANOPROST 50 UG/ML
SOLUTION/ DROPS OPHTHALMIC
COMMUNITY
Start: 2024-03-19

## 2024-03-21 RX ORDER — TRIAMCINOLONE ACETONIDE 40 MG/ML
40 INJECTION, SUSPENSION INTRA-ARTICULAR; INTRAMUSCULAR
Status: COMPLETED | OUTPATIENT
Start: 2024-03-21 | End: 2024-03-21

## 2024-03-21 RX ADMIN — LIDOCAINE HYDROCHLORIDE 4 ML: 10 INJECTION, SOLUTION INFILTRATION; PERINEURAL at 08:00

## 2024-03-21 RX ADMIN — TRIAMCINOLONE ACETONIDE 40 MG: 40 INJECTION, SUSPENSION INTRA-ARTICULAR; INTRAMUSCULAR at 08:00

## 2024-03-21 NOTE — PROGRESS NOTES
"   Subjective:  Chief Complaint   Patient presents with    Right Shoulder - Pain, Swelling    Right Arm - Pain, Swelling       Ileana Paz is a 73 y.o. female complains of right shoulder pain. Onset of the symptoms was several weeks ago.  Mechanism of injury:  none reported . Aggravating factors:  difficulty with motion of the shoulder, feels the shoulder is stifferning and getting stuck with motion lifting overhead and external rotation . Treatment to date: ice and rest. Symptoms have progressed to a point and plateaued.    The following portions of the patient's history were reviewed and updated as appropriate:   Medical history  Family history  Medication   PSH  Allergies      Occupation:         Objective:  /82   Pulse 58   Temp 98.2 °F (36.8 °C)   Resp 16   Ht 5' 4\" (1.626 m)   Wt 73 kg (161 lb)   SpO2 98%   BMI 27.64 kg/m²     Skin: no rashes, lesions, skin discolorations, lacerations  Vasculature: normal radial and ulnar pulse,  normal skin color, normal capillary refill in extremity, no upper extremity edema  Neurologic: Neurologic exam is normal throughout upper extremities, Awake, alert, and oriented x3, no apparent distress.   Musculoskeletal:   right SHOULDER EXAM    General: no gross deformity, no skin changes redness or warmth noted, no sign of infection    ROM:   FF (180): 160  ER (90): 30  IR : right hip      Supraspinatus strength testin/5  Infraspinatus strength testin/5  Subscapularis 5/5    Belly press: negative      Empty can: positive  Rivera: +  Neers +  Speed -  Biceps TTP: positive      +Creptius anterior joint line with external rotation of the shoulder    Imaging:      XR SHOULDER, 2 OR MORE VIEWS    Result Date: 3/14/2024  Narrative: EXAM XR SHOULDER, 2 OR MORE VIEWS - 3/14/2024 3:19 pm HISTORY acute right shoulder pain with limited ROM TECHNIQUE Three views of the right shoulder. COMPARISON 2021. FINDINGS No acute fracture or dislocation.  Normal " glenohumeral alignment.  Hypertrophic changes of the glenohumeral joint with subchondral cystic change and prominent osteophytes.  Moderate hypertrophic changes of the acromioclavicular joint.    Impression: IMPRESSION No acute fracture or dislocation.  Glenohumeral osteoarthritis and moderate acromioclavicular osteoarthritis.       Assessment/Plan:  1. Glenohumeral arthritis, right  73-year-old female patient presenting today for initial evaluation of right shoulder pain.  Radiographs from primary care office visit were reviewed independently.  There was moderate glenohumeral arthritis and AC joint arthritis noted on radiographs.  Patient examination reveals limited range of motion with external rotation along with forward flexion.  My clinical impression is that the patient is symptomatic from underlying glenohumeral arthritis.  We discussed the nature of glenohumeral arthritis and the recommended treatment plan.  Advised patient to begin Voltaren topical gel.  Unfortunately she has a history of gastric ulcer and would not be eligible for NSAID medication.  Discussed the role for corticosteroid injection which patient is agreeable to-performed in office today without complication.  She was referred to physical therapy for shoulder strengthening and range of motion.  She will contact me if symptoms recur or fail to improve.  - Ambulatory Referral to Physical Therapy; Future

## 2024-03-21 NOTE — PROGRESS NOTES
Large joint arthrocentesis: R glenohumeral  [place] Protocol:  Consent: Verbal consent obtained.  Risks and benefits: risks, benefits and alternatives were discussed  Consent given by: patient  Patient identity confirmed: verbally with patient  Supporting Documentation  Indications: pain   Procedure Details  Location: shoulder - R glenohumeral  Needle size: 22 G  Ultrasound guidance: yes  Approach: posterior  Medications administered: 4 mL lidocaine 1 %; 40 mg triamcinolone acetonide 40 mg/mL    Patient tolerance: patient tolerated the procedure well with no immediate complications    Risks and benefits of CSI were discussed with patient extensively. Risks were highlighted which included but were not limited to infection, pain, local site swelling, and chance that injection may not be effective. Patient was also counseled regarding glucose elevation days after receiving CSI and to be mindful of diet and check sugars daily. Patient agreeable to proceed with CSI after counseling.     US video clip was saved to the [employer]

## 2024-03-21 NOTE — PROGRESS NOTES
[facility] - Outpatient Follow-up Note  Ileana Paz 73 y.o. female MRN: [id number]  Encounter: [id number]          ASSESSMENT AND PLAN:      1. RUQ abdominal pain  Pain seems to be more in the the umbilical area on the right side, worse with sitting, better with standing up.  Denies any association with eating or bowel movements.  Labs in the ER including CBC, CMP, lipase and imaging with CAT scan and ultrasound were unremarkable with the exception of minor T. bili elevation 1.19.  Pain has been improving daily since her ER visit, but is still there.  Feels like a soreness/side stitch like when you are running.  Etiology unclear, possible musculoskeletal versus viral.    -Recheck hepatic function panel  -Trial course of Carafate  -Monitor symptoms, asked patient to [employer] message in a week to see how she is feeling  -Further recommendations pending course  -     Ambulatory Referral to Gastroenterology    2. Large hiatal hernia  3. Gastroesophageal reflux disease with esophagitis, unspecified whether hemorrhage  Patient with history of medium type II hiatal hernia and esophageal ulcer which was healed on last endoscopy in October 2023.  Biopsies were negative for Quispe's esophagus at that time.    -Continue Protonix twice daily  -Continue small meals, avoidance of laying down for 3 to 4 hours after eating  -Continue follow-up with Dr. Lockhart for consideration of hiatal hernia repair  -Recommend repeat EGD in October 2025 unless otherwise indicated    4. Diverticulosis  Continue high-fiber diet to prevent complications    5. Screening for colon cancer  Last colonoscopy in August 2023 showed diverticular disease, no colon polyps.  She is due for repeat in August 2033.      ______________________________________________________________________    SUBJECTIVE:  Ileana Paz is a 73 y.o. female with history of anxiety, arthritis, SVT, diverticulitis, colon polyps presenting for  follow-up to ED visit.  She was seen in the ED on 3/18 due to right sided umbilical pain which occurred while at work several days prior.  She reports that feels like a soreness/side stitch like when you are running and was associated with some nausea but no vomiting.  She also noted some yellow/green stool.  Denies any fevers or chills or injury, however does note that her job is fairly physical and she is constantly moving boxes.    In the ED, CBC, CMP, lipase unremarkable except slight total bilirubin elevation 1.19.  CT abdomen pelvis with contrast was performed showing no acute abnormality.  Ultrasound showed no gallstones, cholecystitis, or biliary dilation.  She reports she was given morphine which improved the pain and then was discharged.  Pain has been incrementally improving since then but is still present.  Worse with sitting, better with standing.  Does not seem to be impacted by eating or bowel movements.        REVIEW OF SYSTEMS IS OTHERWISE NEGATIVE.      Historical Information   Past Medical History:   Diagnosis Date    Anxiety     Arthritis     Colon polyp     Diverticulitis     GERD (gastroesophageal reflux disease)     Prolapsed uterus     SVT (supraventricular tachycardia)     last had 2 years ago; last saw cardiologist 9 months ago-had EKG- no problems at this visit.  10/30/23     Past Surgical History:   Procedure Laterality Date    CATARACT EXTRACTION, BILATERAL      COLONOSCOPY      EGD      RETINAL DETACHMENT SURGERY       Social History   Social History     Substance and Sexual Activity   Alcohol Use Yes    Alcohol/week: 4.0 standard drinks of alcohol    Types: 4 Glasses of wine per week    Comment: social-weekend     Social History     Substance and Sexual Activity   Drug Use No     Social History     Tobacco Use   Smoking Status Never   Smokeless Tobacco Never     Family History   Problem Relation Age of Onset    Cancer Mother     Colon cancer Mother     Brain cancer Father   "      Meds/Allergies       Current Outpatient Medications:     albuterol (PROVENTIL HFA,VENTOLIN HFA) 90 mcg/act inhaler    aluminum-magnesium hydroxide 200-200 MG/5ML suspension    ascorbic acid (VITAMIN C) 1000 MG tablet    Cholecalciferol 25 MCG (1000 UT) tablet    dorzolamide-timolol (COSOPT) 22.3-6.8 MG/ML ophthalmic solution    latanoprost (XALATAN) 0.005 % ophthalmic solution    lisinopril (ZESTRIL) 10 mg tablet    metoprolol succinate (TOPROL-XL) 25 mg 24 hr tablet    ondansetron (ZOFRAN) 4 mg tablet    pantoprazole (PROTONIX) 40 mg tablet    pyridoxine (VITAMIN B6) 50 mg tablet    Turmeric 500 MG CAPS    Zinc 50 MG TABS    atorvastatin (LIPITOR) 10 mg tablet    ketorolac (ACULAR) 0.4 % SOLN    omeprazole (PriLOSEC) 20 mg delayed release capsule    prednisoLONE acetate (PRED FORTE) 1 % ophthalmic suspension  No current facility-administered medications for this visit.    Allergies   Allergen Reactions    Bactrim [Sulfamethoxazole-Trimethoprim] Rash           Objective     Blood pressure 144/78, pulse 81, height 5' 4\" (1.626 m), weight 73 kg (161 lb). Body mass index is 27.64 kg/m².      PHYSICAL EXAM:      General Appearance:   Alert, cooperative, no distress   HEENT:   Normocephalic, atraumatic, anicteric.     Neck:  Supple, symmetrical, trachea midline   Lungs:   Clear to auscultation bilaterally; no rales, rhonchi or wheezing; respirations unlabored    Heart::   Regular rate and rhythm; no murmur.   Abdomen:   Soft, R side umbilicus slightly tender, non-distended; normal bowel sounds; no masses, no organomegaly ; no rigidity, discoloration, guarding   Genitalia:   Deferred    Rectal:   Deferred    Extremities:  No cyanosis, clubbing or edema    Skin:  No jaundice, rashes, or lesions    Lymph nodes:  No palpable cervical lymphadenopathy        Lab Results:   No visits with results within 1 Day(s) from this visit.   Latest known visit with results is:   Admission on 03/18/2024, Discharged on 03/18/2024 "   Component Date Value    WBC 03/18/2024 4.97     RBC 03/18/2024 4.61     Hemoglobin 03/18/2024 13.7     Hematocrit 03/18/2024 41.2     MCV 03/18/2024 89     MCH 03/18/2024 29.7     MCHC 03/18/2024 33.3     RDW 03/18/2024 12.6     MPV 03/18/2024 9.6     Platelets 03/18/2024 218     nRBC 03/18/2024 0     Neutrophils Relative 03/18/2024 47     Immature Grans % 03/18/2024 0     Lymphocytes Relative 03/18/2024 38     Monocytes Relative 03/18/2024 10     Eosinophils Relative 03/18/2024 4     Basophils Relative 03/18/2024 1     Neutrophils Absolute 03/18/2024 2.34     Absolute Immature Grans 03/18/2024 0.01     Absolute Lymphocytes 03/18/2024 1.90     Absolute Monocytes 03/18/2024 0.48     Eosinophils Absolute 03/18/2024 0.18     Basophils Absolute 03/18/2024 0.06     Lipase 03/18/2024 35     Sodium 03/18/2024 138     Potassium 03/18/2024 3.9     Chloride 03/18/2024 104     CO2 03/18/2024 26     ANION GAP 03/18/2024 8     BUN 03/18/2024 14     Creatinine 03/18/2024 0.80     Glucose 03/18/2024 91     Calcium 03/18/2024 9.5     eGFR 03/18/2024 73     Total Bilirubin 03/18/2024 1.19 (H)     Bilirubin, Direct 03/18/2024 0.17     Alkaline Phosphatase 03/18/2024 60     AST 03/18/2024 16     ALT 03/18/2024 12     Total Protein 03/18/2024 7.1     Albumin 03/18/2024 4.2     Color, UA 03/18/2024 Yellow     Clarity, UA 03/18/2024 Clear     pH, UA 03/18/2024 5.5     Leukocytes, UA 03/18/2024 Negative     Nitrite, UA 03/18/2024 Negative     Protein, UA 03/18/2024 Negative     Glucose, UA 03/18/2024 Negative     Ketones, UA 03/18/2024 Trace (A)     Urobilinogen, UA 03/18/2024 0.2     Bilirubin, UA 03/18/2024 Negative     Occult Blood, UA 03/18/2024 Trace (A)     Specific [place], UA 03/18/2024 1.010     RBC, UA 03/18/2024 1-2     WBC, UA 03/18/2024 None Seen     Epithelial Cells 03/18/2024 Occasional     Bacteria, UA 03/18/2024 Occasional     Ventricular Rate 03/18/2024 68     Atrial Rate 03/18/2024 68     TN Interval 03/18/2024 146      QRSD Interval 03/18/2024 68     QT Interval 03/18/2024 386     QTC Interval 03/18/2024 410     P Axis 03/18/2024 102     QRS Axis 03/18/2024 128     T Wave Axis 03/18/2024 107          Radiology Results:   US right upper quadrant    Result Date: 3/18/2024  Narrative: RIGHT UPPER QUADRANT ULTRASOUND INDICATION: r/o cholecystitis, stones. COMPARISON: Same day CT abdomen/pelvis. TECHNIQUE: Real-time ultrasound of the right upper quadrant was performed with a curvilinear transducer with both volumetric sweeps and still imaging techniques. FINDINGS: PANCREAS: Portions of the pancreas are obscured by bowel gas. Visualized portions of the pancreas are unremarkable. AORTA AND IVC: Visualized portions are normal for patient age. LIVER: Size: Within normal range. The liver measures 12.4 cm in the midclavicular line. Contour: Surface contour is smooth. Parenchyma: Echogenicity and echotexture are within normal limits. Multiseptated left hepatic cyst measuring 3.2 cm. Limited imaging of the main portal vein shows it to be patent and hepatopetal. BILIARY: No gallbladder findings. No intrahepatic biliary dilatation. CBD measures 3.0 mm. No choledocholithiasis. KIDNEY: Right kidney measures 9.1 x 4.6 x 3.9 cm. Volume 83.8 mL Kidney within normal limits. ASCITES: None.     Impression: No cholelithiasis or evidence of acute cholecystitis. Workstation performed: [id number]     CT abdomen pelvis with contrast    Result Date: 3/18/2024  Narrative: CT ABDOMEN AND PELVIS WITH IV CONTRAST INDICATION: RUQ and epigastric/mid abdominal pain past 4 days; r/o biliary disease. COMPARISON: Chest CT dated 12/1/2023. TECHNIQUE: CT examination of the abdomen and pelvis was performed. Multiplanar 2D reformatted images were created from the source data. This examination, like all CT scans performed in the [facility], was performed utilizing techniques to minimize radiation dose exposure, including the use of iterative  reconstruction and automated exposure control. Radiation dose length product (DLP) for this visit: 410.16 mGy-cm IV Contrast: 100 mL of iohexol (OMNIPAQUE) Enteric Contrast: Not administered. FINDINGS: ABDOMEN LOWER CHEST: Stable 3 mm right lower lobe nodule on image 7 series 2. Stable moderate hiatal hernia. LIVER/BILIARY TREE: Stable cyst in the lateral segment of the left lower lobe. Additional tiny hypodensity in the right lobe too small to characterize is also stable. Known new liver lesion. No biliary dilatation. GALLBLADDER: No calcified gallstones. No pericholecystic inflammatory change. SPLEEN: Unremarkable. PANCREAS: Unremarkable. ADRENAL GLANDS: Unremarkable. KIDNEYS/URETERS: Unremarkable. No hydronephrosis. STOMACH AND BOWEL: Diverticulosis without diverticulitis. No obstruction. APPENDIX: No findings to suggest appendicitis. ABDOMINOPELVIC CAVITY: No ascites. No pneumoperitoneum. No lymphadenopathy. VESSELS: No abdominal aortic aneurysm. stable 6 mm peripherally calcified splenic artery aneurysm. PELVIS REPRODUCTIVE ORGANS: Unremarkable for patient's age. URINARY BLADDER: Unremarkable. ABDOMINAL WALL/INGUINAL REGIONS: Unremarkable. BONES: No acute osseous abnormality. Spinal degenerative changes.     Impression: No acute intra-abdominal pathology. Workstation performed: [id number]     XR SHOULDER, 2 OR MORE VIEWS    Result Date: 3/14/2024  Narrative: EXAM XR SHOULDER, 2 OR MORE VIEWS - 3/14/2024 3:19 pm HISTORY acute right shoulder pain with limited ROM TECHNIQUE Three views of the right shoulder. COMPARISON 02/26/2021. FINDINGS No acute fracture or dislocation.  Normal glenohumeral alignment.  Hypertrophic changes of the glenohumeral joint with subchondral cystic change and prominent osteophytes.  Moderate hypertrophic changes of the acromioclavicular joint.    Impression: IMPRESSION No acute fracture or dislocation.  Glenohumeral osteoarthritis and moderate acromioclavicular osteoarthritis.

## 2024-07-15 DIAGNOSIS — K21.00 GASTROESOPHAGEAL REFLUX DISEASE WITH ESOPHAGITIS WITHOUT HEMORRHAGE: ICD-10-CM

## 2024-07-15 DIAGNOSIS — R13.19 ESOPHAGEAL DYSPHAGIA: ICD-10-CM

## 2024-07-15 RX ORDER — PANTOPRAZOLE SODIUM 40 MG/1
TABLET, DELAYED RELEASE ORAL
Qty: 200 TABLET | Refills: 1 | Status: SHIPPED | OUTPATIENT
Start: 2024-07-15

## 2024-08-27 ENCOUNTER — TELEPHONE (OUTPATIENT)
Dept: GASTROENTEROLOGY | Facility: CLINIC | Age: 74
End: 2024-08-27

## 2025-02-05 ENCOUNTER — APPOINTMENT (OUTPATIENT)
Dept: LAB | Facility: HOSPITAL | Age: 75
End: 2025-02-05
Payer: COMMERCIAL

## 2025-02-05 DIAGNOSIS — R17 SERUM TOTAL BILIRUBIN ELEVATED: ICD-10-CM

## 2025-02-05 LAB
ALBUMIN SERPL BCG-MCNC: 4.3 G/DL (ref 3.5–5)
ALP SERPL-CCNC: 65 U/L (ref 34–104)
ALT SERPL W P-5'-P-CCNC: 13 U/L (ref 7–52)
AST SERPL W P-5'-P-CCNC: 16 U/L (ref 13–39)
BILIRUB DIRECT SERPL-MCNC: 0.16 MG/DL (ref 0–0.2)
BILIRUB SERPL-MCNC: 1.31 MG/DL (ref 0.2–1)
PROT SERPL-MCNC: 7.4 G/DL (ref 6.4–8.4)

## 2025-02-05 PROCEDURE — 80076 HEPATIC FUNCTION PANEL: CPT

## 2025-02-05 PROCEDURE — 36415 COLL VENOUS BLD VENIPUNCTURE: CPT

## 2025-02-12 ENCOUNTER — OFFICE VISIT (OUTPATIENT)
Dept: GASTROENTEROLOGY | Facility: CLINIC | Age: 75
End: 2025-02-12
Payer: COMMERCIAL

## 2025-02-12 VITALS
SYSTOLIC BLOOD PRESSURE: 180 MMHG | BODY MASS INDEX: 26.8 KG/M2 | HEART RATE: 77 BPM | WEIGHT: 157 LBS | HEIGHT: 64 IN | DIASTOLIC BLOOD PRESSURE: 83 MMHG

## 2025-02-12 DIAGNOSIS — K21.00 GASTROESOPHAGEAL REFLUX DISEASE WITH ESOPHAGITIS, UNSPECIFIED WHETHER HEMORRHAGE: ICD-10-CM

## 2025-02-12 DIAGNOSIS — K44.9 LARGE HIATAL HERNIA: ICD-10-CM

## 2025-02-12 DIAGNOSIS — D12.6 ADENOMATOUS POLYP OF COLON, UNSPECIFIED PART OF COLON: ICD-10-CM

## 2025-02-12 DIAGNOSIS — R17 SERUM TOTAL BILIRUBIN ELEVATED: Primary | ICD-10-CM

## 2025-02-12 PROCEDURE — 99214 OFFICE O/P EST MOD 30 MIN: CPT | Performed by: PHYSICIAN ASSISTANT

## 2025-02-12 NOTE — PROGRESS NOTES
Name: Ileana Paz      : 1950      MRN: [id number]  Encounter Provider: Jahaira Galloway PA-C  Encounter Date: 2025   Encounter department: [facility] [address]  :  Assessment & Plan  Serum total bilirubin elevated  Patient likely with Gilbert's syndrome, bilirubin elevated at but this is all indirect       Gastroesophageal reflux disease with esophagitis, unspecified whether hemorrhage  Patient with history of GERD continue pantoprazole and adhere to diet patient with large hiatal hernia       Large hiatal hernia  Went for surgical consultation but decided to defer this, can consider upper endoscopy in a few years to evaluate this further but no evidence of Quispe's but no urgent need to repeat endoscopy at this time as symptoms are controlled       Adenomatous polyp of colon, unspecified part of colon  Last colonoscopy in 2023 showed diverticular disease, no colon polyps. She is due for repeat in 2033.       Pt to follow up in 1 year      History of Present Illness   HPI  Ileana Paz is a 74 y.o. female who presents for follow-up to labs.  Patient was seen previously and was noted to have isolated elevated bilirubin which was indirect.  She has history of GERD and large hiatal hernia and had EGD in 2023 which did show severe erythematous friable ulcerated mucosa lower third of the esophagus although biopsies were benign with no evidence of Quispe's and then she had repeat EGD thereafter which did show a hiatal hernia and esophagitis had healed.  Patient otherwise had colonoscopy back in  as well which was normal.  She currently is taking pantoprazole, Carafate was ordered at last office visit.  Patient states that she adheres to her diet and she is feeling well and she just recently had blood work for the elevated bilirubin which is stable.  She reports that she went to see surgery last year and discussed about getting  "the hiatal hernia repaired but she decided not to have this done.  She reports that she always has issues with having surgery so she wants to defer this.  Patient sometimes does have hard pellet stools but other times stools will be normal.  Does report some bloating especially after consuming lactose containing foods but does find that she is more gassy recently.           EGD 10/23-  IMPRESSION:  Medium type II hiatal hernia  Quispe's esophagus; performed cold forceps biopsy    Path-     A. Esophagus, EG Junction, Biopsy:  - Squamocolumnar junctional mucosa with mild chronic inflammation.  - No squamous intraepithelial eosinophils are noted.   - Negative for intestinal metaplasia, dysplasia, and malignancy.   - Deeper levels examined.     B. Esophagus, 2 cm Above EGJ, Biopsy:  - Squamocolumnar junctional mucosa with mild chronic inflammation and rare squamous intraepithelial eosinophils; suggestive of reflux esophagitis.  - Negative for intestinal metaplasia, dysplasia, and malignancy.   - Deeper levels examined.         Colonoscopy- 8/23  IMPRESSION:  Extensive diverticulosis of moderate severity in the proximal ascending colon, distal ascending colon, proximal transverse colon, mid transverse colon, distal transverse colon, proximal descending colon, distal descending colon, proximal sigmoid colon, mid sigmoid colon and distal sigmoid colon        Review of Systems       Objective   BP (!) 180/83 (BP Location: Left arm, Patient Position: Sitting, Cuff Size: Standard)   Pulse 77   Ht 5' 4\" (1.626 m)   Wt 71.2 kg (157 lb)   BMI 26.95 kg/m²      Physical Exam  General Alert acute distress  Heart normal S1/s2  Lungs clear to auscultation   Abdomen soft positive bowel sounds nontender and          "

## 2025-02-12 NOTE — ASSESSMENT & PLAN NOTE
Went for surgical consultation but decided to defer this, can consider upper endoscopy in a few years to evaluate this further but no evidence of Quispe's but no urgent need to repeat endoscopy at this time as symptoms are controlled

## 2025-02-12 NOTE — ASSESSMENT & PLAN NOTE
Patient with history of GERD continue pantoprazole and adhere to diet patient with large hiatal hernia

## 2025-06-10 ENCOUNTER — HOSPITAL ENCOUNTER (EMERGENCY)
Facility: HOSPITAL | Age: 75
Discharge: HOME | End: 2025-06-10
Attending: EMERGENCY MEDICINE | Admitting: EMERGENCY MEDICINE
Payer: COMMERCIAL

## 2025-06-10 ENCOUNTER — APPOINTMENT (EMERGENCY)
Dept: CT IMAGING | Facility: HOSPITAL | Age: 75
End: 2025-06-10
Payer: COMMERCIAL

## 2025-06-10 VITALS
RESPIRATION RATE: 17 BRPM | DIASTOLIC BLOOD PRESSURE: 86 MMHG | SYSTOLIC BLOOD PRESSURE: 188 MMHG | TEMPERATURE: 98.3 F | OXYGEN SATURATION: 98 % | HEART RATE: 83 BPM

## 2025-06-10 DIAGNOSIS — K57.92 ACUTE DIVERTICULITIS: ICD-10-CM

## 2025-06-10 DIAGNOSIS — R93.5 ABNORMAL CT SCAN, PELVIS: ICD-10-CM

## 2025-06-10 DIAGNOSIS — R10.30 LOWER ABDOMINAL PAIN: Primary | ICD-10-CM

## 2025-06-10 DIAGNOSIS — R11.0 NAUSEA: ICD-10-CM

## 2025-06-10 LAB
ALBUMIN SERPL BCG-MCNC: 4.1 G/DL (ref 3.5–5)
ALP SERPL-CCNC: 67 U/L (ref 34–104)
ALT SERPL W P-5'-P-CCNC: 10 U/L (ref 7–52)
ANION GAP SERPL CALCULATED.3IONS-SCNC: 7 MMOL/L (ref 4–13)
AST SERPL W P-5'-P-CCNC: 13 U/L (ref 13–39)
BASOPHILS # BLD AUTO: 0.06 THOUSANDS/ÂΜL (ref 0–0.1)
BASOPHILS NFR BLD AUTO: 1 % (ref 0–1)
BILIRUB DIRECT SERPL-MCNC: 0.13 MG/DL (ref 0–0.2)
BILIRUB SERPL-MCNC: 0.77 MG/DL (ref 0.2–1)
BILIRUB UR QL STRIP: NEGATIVE
BUN SERPL-MCNC: 12 MG/DL (ref 5–25)
CALCIUM SERPL-MCNC: 9.6 MG/DL (ref 8.4–10.2)
CHLORIDE SERPL-SCNC: 105 MMOL/L (ref 96–108)
CLARITY UR: CLEAR
CO2 SERPL-SCNC: 26 MMOL/L (ref 21–32)
COLOR UR: COLORLESS
CREAT SERPL-MCNC: 0.85 MG/DL (ref 0.6–1.3)
EOSINOPHIL # BLD AUTO: 0.22 THOUSAND/ÂΜL (ref 0–0.61)
EOSINOPHIL NFR BLD AUTO: 3 % (ref 0–6)
ERYTHROCYTE [DISTWIDTH] IN BLOOD BY AUTOMATED COUNT: 12.5 % (ref 11.6–15.1)
GFR SERPL CREATININE-BSD FRML MDRD: 67 ML/MIN/1.73SQ M
GLUCOSE SERPL-MCNC: 105 MG/DL (ref 65–140)
GLUCOSE UR STRIP-MCNC: NEGATIVE MG/DL
HCT VFR BLD AUTO: 39.8 % (ref 34.8–46.1)
HGB BLD-MCNC: 13.4 G/DL (ref 11.5–15.4)
HGB UR QL STRIP.AUTO: NEGATIVE
IMM GRANULOCYTES # BLD AUTO: 0.01 THOUSAND/UL (ref 0–0.2)
IMM GRANULOCYTES NFR BLD AUTO: 0 % (ref 0–2)
KETONES UR STRIP-MCNC: NEGATIVE MG/DL
LACTATE SERPL-SCNC: 0.6 MMOL/L (ref 0.5–2)
LEUKOCYTE ESTERASE UR QL STRIP: NEGATIVE
LIPASE SERPL-CCNC: 34 U/L (ref 11–82)
LYMPHOCYTES # BLD AUTO: 1.91 THOUSANDS/ÂΜL (ref 0.6–4.47)
LYMPHOCYTES NFR BLD AUTO: 29 % (ref 14–44)
MAGNESIUM SERPL-MCNC: 1.9 MG/DL (ref 1.9–2.7)
MCH RBC QN AUTO: 29.1 PG (ref 26.8–34.3)
MCHC RBC AUTO-ENTMCNC: 33.7 G/DL (ref 31.4–37.4)
MCV RBC AUTO: 87 FL (ref 82–98)
MONOCYTES # BLD AUTO: 0.55 THOUSAND/ÂΜL (ref 0.17–1.22)
MONOCYTES NFR BLD AUTO: 8 % (ref 4–12)
NEUTROPHILS # BLD AUTO: 3.76 THOUSANDS/ÂΜL (ref 1.85–7.62)
NEUTS SEG NFR BLD AUTO: 59 % (ref 43–75)
NITRITE UR QL STRIP: NEGATIVE
NRBC BLD AUTO-RTO: 0 /100 WBCS
PH UR STRIP.AUTO: 5 [PH]
PLATELET # BLD AUTO: 233 THOUSANDS/UL (ref 149–390)
PMV BLD AUTO: 9.4 FL (ref 8.9–12.7)
POTASSIUM SERPL-SCNC: 3.7 MMOL/L (ref 3.5–5.3)
PROT SERPL-MCNC: 7.2 G/DL (ref 6.4–8.4)
PROT UR STRIP-MCNC: NEGATIVE MG/DL
RBC # BLD AUTO: 4.6 MILLION/UL (ref 3.81–5.12)
SODIUM SERPL-SCNC: 138 MMOL/L (ref 135–147)
SP GR UR STRIP.AUTO: 1.01 (ref 1–1.03)
UROBILINOGEN UR STRIP-ACNC: <2 MG/DL
WBC # BLD AUTO: 6.51 THOUSAND/UL (ref 4.31–10.16)

## 2025-06-10 PROCEDURE — 85025 COMPLETE CBC W/AUTO DIFF WBC: CPT | Performed by: EMERGENCY MEDICINE

## 2025-06-10 PROCEDURE — 96374 THER/PROPH/DIAG INJ IV PUSH: CPT

## 2025-06-10 PROCEDURE — 96375 TX/PRO/DX INJ NEW DRUG ADDON: CPT

## 2025-06-10 PROCEDURE — 99285 EMERGENCY DEPT VISIT HI MDM: CPT | Performed by: EMERGENCY MEDICINE

## 2025-06-10 PROCEDURE — 80048 BASIC METABOLIC PNL TOTAL CA: CPT | Performed by: EMERGENCY MEDICINE

## 2025-06-10 PROCEDURE — 99284 EMERGENCY DEPT VISIT MOD MDM: CPT

## 2025-06-10 PROCEDURE — 96361 HYDRATE IV INFUSION ADD-ON: CPT

## 2025-06-10 PROCEDURE — 83690 ASSAY OF LIPASE: CPT | Performed by: EMERGENCY MEDICINE

## 2025-06-10 PROCEDURE — 74177 CT ABD & PELVIS W/CONTRAST: CPT

## 2025-06-10 PROCEDURE — 36415 COLL VENOUS BLD VENIPUNCTURE: CPT | Performed by: EMERGENCY MEDICINE

## 2025-06-10 PROCEDURE — 83735 ASSAY OF MAGNESIUM: CPT | Performed by: EMERGENCY MEDICINE

## 2025-06-10 PROCEDURE — 83605 ASSAY OF LACTIC ACID: CPT | Performed by: EMERGENCY MEDICINE

## 2025-06-10 PROCEDURE — 80076 HEPATIC FUNCTION PANEL: CPT | Performed by: EMERGENCY MEDICINE

## 2025-06-10 PROCEDURE — 81003 URINALYSIS AUTO W/O SCOPE: CPT | Performed by: EMERGENCY MEDICINE

## 2025-06-10 RX ORDER — DICYCLOMINE HCL 20 MG
20 TABLET ORAL ONCE
Status: COMPLETED | OUTPATIENT
Start: 2025-06-10 | End: 2025-06-10

## 2025-06-10 RX ORDER — ONDANSETRON 2 MG/ML
4 INJECTION INTRAMUSCULAR; INTRAVENOUS ONCE
Status: COMPLETED | OUTPATIENT
Start: 2025-06-10 | End: 2025-06-10

## 2025-06-10 RX ORDER — ONDANSETRON 4 MG/1
4 TABLET, ORALLY DISINTEGRATING ORAL EVERY 8 HOURS PRN
Qty: 12 TABLET | Refills: 0 | Status: SHIPPED | OUTPATIENT
Start: 2025-06-10

## 2025-06-10 RX ORDER — KETOROLAC TROMETHAMINE 30 MG/ML
15 INJECTION, SOLUTION INTRAMUSCULAR; INTRAVENOUS ONCE
Status: COMPLETED | OUTPATIENT
Start: 2025-06-10 | End: 2025-06-10

## 2025-06-10 RX ADMIN — AMOXICILLIN AND CLAVULANATE POTASSIUM 1 TABLET: 875; 125 TABLET, FILM COATED ORAL at 16:43

## 2025-06-10 RX ADMIN — SODIUM CHLORIDE 1000 ML: 0.9 INJECTION, SOLUTION INTRAVENOUS at 15:08

## 2025-06-10 RX ADMIN — KETOROLAC TROMETHAMINE 15 MG: 30 INJECTION, SOLUTION INTRAMUSCULAR; INTRAVENOUS at 15:08

## 2025-06-10 RX ADMIN — DICYCLOMINE HYDROCHLORIDE 20 MG: 20 TABLET ORAL at 15:08

## 2025-06-10 RX ADMIN — ONDANSETRON 4 MG: 2 INJECTION INTRAMUSCULAR; INTRAVENOUS at 15:08

## 2025-06-10 RX ADMIN — IOHEXOL 100 ML: 350 INJECTION, SOLUTION INTRAVENOUS at 15:41

## 2025-06-10 NOTE — ED PROVIDER NOTES
Time reflects when diagnosis was documented in both MDM as applicable and the Disposition within this note       Time User Action Codes Description Comment    6/10/2025  4:38 PM AuAnna green E Add [R10.30] Lower abdominal pain     6/10/2025  4:38 PM Aupeter, Anna E Add [R11.0] Nausea     6/10/2025  4:38 PM Aufiero Anna E Add [K57.92] Acute diverticulitis     6/10/2025  4:40 PM Auianero Anna E Add [R93.5] Abnormal CT scan, pelvis     6/10/2025  4:40 PM Aupeter Anna E Modify [R93.5] Abnormal CT scan, pelvis Thichening of vagina seen on CT abdomen/pelvis, with likely prolapse but GYN consult and MRI recommended          ED Disposition       ED Disposition   Discharge    Condition   Stable    Date/Time   Tue Chase 10, 2025  4:40 PM    Comment   Ileana Paz discharge to home/self care.                   Assessment & Plan       Medical Decision Making  74-year-old female presents to the ED for 4 days of colicky lower abdominal pain bilaterally as well as right upper quadrant sharp pain.  She reports nausea intermittently and today had diarrhea, 1 episode.  Differential diagnosis includes acute enteritis, colitis, gastritis, diverticulitis, appendicitis, UTI or pyelonephritis, pancreatitis, biliary colic or cholecystitis.  Will evaluate with abdominal labs, UA, CT scan of the abdomen and pelvis.  Will provide IV fluid bolus, Zofran, Bentyl, Toradol for symptom relief.    Amount and/or Complexity of Data Reviewed  Labs: ordered. Decision-making details documented in ED Course.  Radiology: ordered. Decision-making details documented in ED Course.    Risk  Prescription drug management.        ED Course as of 06/10/25 1652   Tue Chase 10, 2025   1532 Labs reviewed and unremarkable.    1623 Radiologist called to discuss CT findings of diverticulitis without any obvious complications such as abscess or microperforation.  He did report that there is some thickening of the vagina concerning for a prolapse and did  recommend outpatient follow-up.  Patient does have known uterine prolapse but will recommend she either follow-up with her PCP or OB/GYN to consider further imaging as recommended by radiology.   1637 Updated patient about CT findings and plan to treat for diverticulitis.  Patient recently had a colonoscopy about 2 years ago however I still encouraged GI follow-up to discuss need for repeat colonoscopy especially given that this is the first time she had diverticulitis.  I also discussed incidental findings of the vaginal thickening for which possible MRI would be indicated as well as gynecology consultation.  She states she has an appointment with her PCP on 6/18 and I encouraged her to discuss these findings as her PCP could order the MRI while awaiting referral to GYN.  Discussed symptomatic management at home and did offer analgesic prescription but patient declined.  Discussed ED return parameters.  A copy of patient's CT report was provided to her upon discharge.       Medications   sodium chloride 0.9 % bolus 1,000 mL (0 mL Intravenous Stopped 6/10/25 1644)   ondansetron (ZOFRAN) injection 4 mg (4 mg Intravenous Given 6/10/25 1508)   ketorolac (TORADOL) injection 15 mg (15 mg Intravenous Given 6/10/25 1508)   dicyclomine (BENTYL) tablet 20 mg (20 mg Oral Given 6/10/25 1508)   iohexol (OMNIPAQUE) 350 MG/ML injection (MULTI-DOSE) 100 mL (100 mL Intravenous Given 6/10/25 1541)   amoxicillin-clavulanate (AUGMENTIN) 875-125 mg per tablet 1 tablet (1 tablet Oral Given 6/10/25 1643)       ED Risk Strat Scores                    No data recorded        SBIRT 22yo+      Flowsheet Row Most Recent Value   Initial Alcohol Screen: US AUDIT-C     1. How often do you have a drink containing alcohol? 0 Filed at: 06/10/2025 1421   2. How many drinks containing alcohol do you have on a typical day you are drinking?  0 Filed at: 06/10/2025 1421   3b. FEMALE Any Age, or MALE 65+: How often do you have 4 or more drinks on one  occassion? 0 Filed at: 06/10/2025 1421   Audit-C Score 0 Filed at: 06/10/2025 1421   IRA: How many times in the past year have you...    Used an illegal drug or used a prescription medication for non-medical reasons? Never Filed at: 06/10/2025 1421                            History of Present Illness       Chief Complaint   Patient presents with    Abdominal Pain     Pt presents to ER from home for reports of generalized upper abd pain ongoing x4 days, reports bloating. Pt reports a few episodes of intermittent diarrhea.        Past Medical History[1]   Past Surgical History[2]   Family History[3]   Social History[4]   E-Cigarette/Vaping    E-Cigarette Use Never User       E-Cigarette/Vaping Substances    Nicotine No     THC No     CBD No     Flavoring No     Other No     Unknown No       I have reviewed and agree with the history as documented.     Patient is a 74-year-old female with past medical history of GERD, hiatal hernia, hypertension, remote history of SVT not currently on any antiarrhythmic medication, presents to the emergency department for abdominal pain.  Patient states she started with abdominal pain all across her lower abdomen starting on the left side radiating to the right side on Friday, 4 days ago.  She states the pain comes and goes and describes it as an aching pain.  She also reports pain in her right upper abdomen the feels more sharp at times.  Denies any worsening after food intake.  She states sitting seems to exacerbate the pain.  No alleviating factors.  Also reports feeling more bloated and distended in her abdomen.  Patient reports that she has had some nausea but no vomiting and last night she took Dulcolax despite still having normal bowel movements every day.  She states today she went a couple of times and the stool was very hard and small at first and then she had 1 episode of diarrhea.  She denies any blood per rectum, melena, UTI symptoms or flank pain, fevers or chills,  headache, dizziness or near syncope, cough, URI symptoms, chest pain, shortness of breath, palpitations, skin rash or color change, leg pain or swelling, focal neurologic deficits.  Denies any prior abdominal surgeries.      History provided by:  Patient   used: No    Abdominal Pain  Associated symptoms: diarrhea and nausea    Associated symptoms: no chest pain, no chills, no constipation, no cough, no dysuria, no fever, no hematuria, no shortness of breath, no sore throat and no vomiting        Review of Systems   Constitutional:  Negative for chills and fever.   HENT:  Negative for congestion, ear pain, rhinorrhea and sore throat.    Respiratory:  Negative for cough, chest tightness, shortness of breath and wheezing.    Cardiovascular:  Negative for chest pain and palpitations.   Gastrointestinal:  Positive for abdominal pain, diarrhea and nausea. Negative for abdominal distention, blood in stool, constipation and vomiting.   Genitourinary:  Negative for dysuria, flank pain, frequency and hematuria.   Musculoskeletal:  Negative for back pain and neck pain.   Skin:  Negative for color change, pallor, rash and wound.   Allergic/Immunologic: Negative for immunocompromised state.   Neurological:  Negative for dizziness, syncope, weakness, light-headedness, numbness and headaches.   Hematological:  Negative for adenopathy.   Psychiatric/Behavioral:  Negative for confusion and decreased concentration.    All other systems reviewed and are negative.          Objective       ED Triage Vitals   Temperature Pulse Blood Pressure Respirations SpO2 Patient Position - Orthostatic VS   06/10/25 1419 06/10/25 1419 06/10/25 1419 06/10/25 1419 06/10/25 1419 06/10/25 1419   98.3 °F (36.8 °C) 83 (!) 188/86 17 98 % Sitting      Temp Source Heart Rate Source BP Location FiO2 (%) Pain Score    06/10/25 1419 06/10/25 1419 06/10/25 1419 -- 06/10/25 1420    Temporal Monitor Left arm  6      Vitals      Date and Time  Temp Pulse SpO2 Resp BP Pain Score FACES Pain Rating User   06/10/25 1420 -- -- -- -- -- 6 -- AM   06/10/25 1419 98.3 °F (36.8 °C) 83 98 % 17 188/86 -- -- KW            Physical Exam  Vitals and nursing note reviewed.   Constitutional:       General: She is not in acute distress.     Appearance: Normal appearance. She is well-developed. She is not ill-appearing, toxic-appearing or diaphoretic.   HENT:      Head: Normocephalic and atraumatic.      Right Ear: External ear normal.      Left Ear: External ear normal.      Mouth/Throat:      Mouth: Mucous membranes are moist.      Pharynx: Oropharynx is clear.     Eyes:      Extraocular Movements: Extraocular movements intact.      Conjunctiva/sclera: Conjunctivae normal.     Neck:      Vascular: No JVD.     Cardiovascular:      Rate and Rhythm: Normal rate and regular rhythm.      Pulses: Normal pulses.      Heart sounds: Normal heart sounds. No murmur heard.     No friction rub. No gallop.   Pulmonary:      Effort: Pulmonary effort is normal. No respiratory distress.      Breath sounds: Normal breath sounds. No wheezing, rhonchi or rales.   Abdominal:      General: Bowel sounds are normal. There is no distension.      Palpations: Abdomen is soft.      Tenderness: There is abdominal tenderness. There is no guarding or rebound.      Comments: Mild diffuse abdominal tenderness most significant in bilateral lower quadrants.     Musculoskeletal:         General: No swelling or tenderness. Normal range of motion.      Cervical back: Normal range of motion and neck supple. No rigidity.     Skin:     General: Skin is warm and dry.      Coloration: Skin is not pale.      Findings: No erythema or rash.     Neurological:      General: No focal deficit present.      Mental Status: She is alert and oriented to person, place, and time.      Sensory: No sensory deficit.      Motor: No weakness.     Psychiatric:         Mood and Affect: Mood normal.         Behavior: Behavior normal.          Results Reviewed       Procedure Component Value Units Date/Time    Basic metabolic panel [[id number]] Collected: 06/10/25 1504    Lab Status: Final result Specimen: Blood from Arm, Right Updated: 06/10/25 1529     Sodium 138 mmol/L      Potassium 3.7 mmol/L      Chloride 105 mmol/L      CO2 26 mmol/L      ANION GAP 7 mmol/L      BUN 12 mg/dL      Creatinine 0.85 mg/dL      Glucose 105 mg/dL      Calcium 9.6 mg/dL      eGFR 67 ml/min/1.73sq m     Narrative:      National Kid[employer] Foundation guidelines for Chronic Kidney Disease (CKD):     Stage 1 with normal or high GFR (GFR > 90 mL/min/1.73 square meters)    Stage 2 Mild CKD (GFR = 60-89 mL/min/1.73 square meters)    Stage 3A Moderate CKD (GFR = 45-59 mL/min/1.73 square meters)    Stage 3B Moderate CKD (GFR = 30-44 mL/min/1.73 square meters)    Stage 4 Severe CKD (GFR = 15-29 mL/min/1.73 square meters)    Stage 5 End Stage CKD (GFR <15 mL/min/1.73 square meters)  Note: GFR calculation is accurate only with a steady state creatinine    Hepatic function panel [[id number]]  (Normal) Collected: 06/10/25 1504    Lab Status: Final result Specimen: Blood from Arm, Right Updated: 06/10/25 1529     Total Bilirubin 0.77 mg/dL      Bilirubin, Direct 0.13 mg/dL      Alkaline Phosphatase 67 U/L      AST 13 U/L      ALT 10 U/L      Total Protein 7.2 g/dL      Albumin 4.1 g/dL     Lipase [[id number]]  (Normal) Collected: 06/10/25 1504    Lab Status: Final result Specimen: Blood from Arm, Right Updated: 06/10/25 1529     Lipase 34 u/L     Magnesium [[id number]]  (Normal) Collected: 06/10/25 1504    Lab Status: Final result Specimen: Blood from Arm, Right Updated: 06/10/25 1529     Magnesium 1.9 mg/dL     Lactic acid, plasma (w/reflex if result > 2.0) [[id number]]  (Normal) Collected: 06/10/25 1504    Lab Status: Final result Specimen: Blood from Arm, Right Updated: 06/10/25 1527     LACTIC ACID 0.6 mmol/L     Narrative:      Result may be elevated if tourniquet was used  during collection.    UA w Reflex to Microscopic w Reflex to Culture [[id number]] Collected: 06/10/25 1504    Lab Status: Final result Specimen: Urine, Other Updated: 06/10/25 1514     Color, UA Colorless     Clarity, UA Clear     Specific Gravity, UA 1.006     pH, UA 5.0     Leukocytes, UA Negative     Nitrite, UA Negative     Protein, UA Negative mg/dl      Glucose, UA Negative mg/dl      Ketones, UA Negative mg/dl      Urobilinogen, UA <2.0 mg/dl      Bilirubin, UA Negative     Occult Blood, UA Negative    CBC and differential [[id number]] Collected: 06/10/25 1504    Lab Status: Final result Specimen: Blood from Arm, Right Updated: 06/10/25 1512     WBC 6.51 Thousand/uL      RBC 4.60 Million/uL      Hemoglobin 13.4 g/dL      Hematocrit 39.8 %      MCV 87 fL      MCH 29.1 pg      MCHC 33.7 g/dL      RDW 12.5 %      MPV 9.4 fL      Platelets 233 Thousands/uL      nRBC 0 /100 WBCs      Segmented % 59 %      Immature Grans % 0 %      Lymphocytes % 29 %      Monocytes % 8 %      Eosinophils Relative 3 %      Basophils Relative 1 %      Absolute Neutrophils 3.76 Thousands/µL      Absolute Immature Grans 0.01 Thousand/uL      Absolute Lymphocytes 1.91 Thousands/µL      Absolute Monocytes 0.55 Thousand/µL      Eosinophils Absolute 0.22 Thousand/µL      Basophils Absolute 0.06 Thousands/µL             CT abdomen pelvis with contrast   Final Interpretation by Tom Ly MD (06/10 1628)      Evidence of sigmoid diverticulitis. No evidence of abscess.      Follow-up colonoscopy is advised and the patient is no longer acute in approximately 2 months time.      Some thickening of the vagina could be related to some inflammation. Some degree of prolapse could be evident versus other diffuse infiltrating processes.      Follow-up GYN examination is advised. MRI could be considered to better assess the vaginal region.      This was discussed with Dr. Erazo at 4:25 p.m.         Workstation performed: [id number]              Procedures    ED Medication and Procedure Management   Prior to Admission Medications   Prescriptions Last Dose Informant Patient Reported? Taking?   Cholecalciferol 25 MCG (1000 UT) tablet  Self Yes No   Sig: Take 1,000 Units by mouth daily   Turmeric 500 MG CAPS  Self Yes No   Sig: Take 500 mg by mouth daily   Zinc 50 MG TABS  Self Yes No   Sig: Take 50 mg by mouth daily   albuterol (PROVENTIL HFA,VENTOLIN HFA) 90 mcg/act inhaler  Self Yes No   Sig: Inhale 2 puffs   aluminum-magnesium hydroxide 200-200 MG/5ML suspension  Self No No   Sig: Take 15 mL by mouth every 6 (six) hours as needed for heartburn   ascorbic acid (VITAMIN C) 1000 MG tablet  Self Yes No   Sig: Take 1,000 mg by mouth daily   dorzolamide-timolol (COSOPT) 22.3-6.8 MG/ML ophthalmic solution  Self Yes No   ketorolac (ACULAR) 0.4 % SOLN  Self Yes No   Patient not taking: Reported on 3/21/2024   latanoprost (XALATAN) 0.005 % ophthalmic solution  Self Yes No   lisinopril (ZESTRIL) 10 mg tablet  Self Yes No   metoprolol succinate (TOPROL-XL) 25 mg 24 hr tablet  Self Yes No   Sig: metoprolol succinate ER 25 mg tablet,extended release 24 hr   ondansetron (ZOFRAN) 4 mg tablet  Self No No   Sig: Take 1 tablet (4 mg total) by mouth every 6 (six) hours   pantoprazole (PROTONIX) 40 mg tablet  Self No No   Sig: TAKE ONE TABLET BY MOUTH TWICE A DAY- TAKE 1/2 HOUR BEFORE BREAKFAST AND DINNER.   prednisoLONE acetate (PRED FORTE) 1 % ophthalmic suspension  Self Yes No   Patient not taking: Reported on 3/21/2024   pyridoxine (VITAMIN B6) 50 mg tablet  Self Yes No   Sig: Take by mouth   sucralfate (CARAFATE) 1 g tablet  Self No No   Sig: Take 1 tablet (1 g total) by mouth 4 (four) times a day      Facility-Administered Medications: None     Patient's Medications   Discharge Prescriptions    AMOXICILLIN-CLAVULANATE (AUGMENTIN) 875-125 MG PER TABLET    Take 1 tablet by mouth every 12 (twelve) hours for 10 days Do not start before June 11, 2025.       Start Date:  6/11/2025 End Date: 6/21/2025       Order Dose: 1 tablet       Quantity: 20 tablet    Refills: 0    ONDANSETRON (ZOFRAN-ODT) 4 MG DISINTEGRATING TABLET    Take 1 tablet (4 mg total) by mouth every 8 (eight) hours as needed for nausea or vomiting       Start Date: 6/10/2025 End Date: --       Order Dose: 4 mg       Quantity: 12 tablet    Refills: 0       ED SEPSIS DOCUMENTATION   Time reflects when diagnosis was documented in both MDM as applicable and the Disposition within this note       Time User Action Codes Description Comment    6/10/2025  4:38 PM Anna Erazo [R10.30] Lower abdominal pain     6/10/2025  4:38 PM Anna Erazo Add [R11.0] Nausea     6/10/2025  4:38 PM Anna Erazo Add [K57.92] Acute diverticulitis     6/10/2025  4:40 PM Anna Erazo Add [R93.5] Abnormal CT scan, pelvis     6/10/2025  4:40 PM Anna Erazo Modify [R93.5] Abnormal CT scan, pelvis Thichening of vagina seen on CT abdomen/pelvis, with likely prolapse but GYN consult and MRI recommended                   Anna Erazo DO  06/10/25 1642       [1]   Past Medical History:  Diagnosis Date    Anxiety     Arthritis     Colon polyp     Diverticulitis     GERD (gastroesophageal reflux disease)     Hiatal hernia     Prolapsed uterus     SVT (supraventricular tachycardia) (HCC)     last had 2 years ago; last saw cardiologist 9 months ago-had EKG- no problems at this visit.  10/30/23   [2]   Past Surgical History:  Procedure Laterality Date    CATARACT EXTRACTION, BILATERAL      COLONOSCOPY      EGD      RETINAL DETACHMENT SURGERY     [3]   Family History  Problem Relation Name Age of Onset    Cancer Mother      Colon cancer Mother      Brain cancer Father     [4]   Social History  Tobacco Use    Smoking status: Never    Smokeless tobacco: Never   Vaping Use    Vaping status: Never Used   Substance Use Topics    Alcohol use: Yes     Alcohol/week: 4.0 standard drinks of alcohol     Types: 4 Glasses of wine per  week     Comment: social-weekend    Drug use: Amanda Erazo,   06/10/25 7996

## 2025-06-11 ENCOUNTER — OFFICE VISIT (OUTPATIENT)
Age: 75
End: 2025-06-11
Attending: EMERGENCY MEDICINE
Payer: COMMERCIAL

## 2025-06-11 VITALS
BODY MASS INDEX: 26.4 KG/M2 | HEIGHT: 64 IN | SYSTOLIC BLOOD PRESSURE: 152 MMHG | WEIGHT: 154.6 LBS | DIASTOLIC BLOOD PRESSURE: 80 MMHG

## 2025-06-11 DIAGNOSIS — N81.4 UTEROVAGINAL PROLAPSE: Primary | ICD-10-CM

## 2025-06-11 PROCEDURE — 99203 OFFICE O/P NEW LOW 30 MIN: CPT | Performed by: OBSTETRICS & GYNECOLOGY

## 2025-06-11 NOTE — PROGRESS NOTES
Name: Ileana Paz      : 1950      MRN: [id number]  Encounter Provider: Capri Vargas DO  Encounter Date: 2025   Encounter department: [facility] [place]  :  Assessment & Plan  Uterovaginal prolapse  Exam findings reviewed with pt in detail today and [facility] patient information sheets on POP and treatment reviewed with pt  Treatment options reviewed extensively including surgical management and conservative management with a pessary  Reviewed process of pessary fitting and maintenance with pt  She will return to office for a pessary fitting             History of Present Illness     HPI    Ileana Paz is a 74 y.o. female who presents today as a new patient for ER follow up.     Was seen in the ED for pelvic pain on 6/10/25  Had CT abd/pelvis which noted   PELVIS     REPRODUCTIVE ORGANS: Calcifications in the uterus suggests fibroids. There appears to be some thickening of the vagina and enhancement not seen previously     URINARY BLADDER: Unremarkable.     ABDOMINAL WALL/INGUINAL REGIONS: Small periumbilical hernia fat..     BONES: No acute fracture or suspicious osseous lesion. Degenerative changes of the spine are noted.     IMPRESSION:     Evidence of sigmoid diverticulitis. No evidence of abscess.     Follow-up colonoscopy is advised and the patient is no longer acute in approximately 2 months time.     Some thickening of the vagina could be related to some inflammation. Some degree of prolapse could be evident versus other diffuse infiltrating processes.     Follow-up GYN examination is advised. MRI could be considered to better assess the vaginal region.    Pt with hx of prolapse x 1 year.   Last GYN exam 35 years ago.     Menopause at age 50  Denies any bleeding, itching, burning odor.   Denies urinary issues. Denies issues with bowel movements.     Last SA 1 year ago.     History obtained from: patient    Review of Systems  Negative  "unless otherwise noted in HPI.     Past Medical History   Past Medical History[1]  Past Surgical History[2]  Family History[3]   reports that she has never smoked. She has never used smokeless tobacco. She reports current alcohol use of about 4.0 standard drinks of alcohol per week. She reports that she does not use drugs.  Current Outpatient Medications   Medication Instructions    albuterol (PROVENTIL HFA,VENTOLIN HFA) 90 mcg/act inhaler 2 puffs    aluminum-magnesium hydroxide 200-200 MG/5ML suspension 15 mL, Oral, Every 6 hours PRN    amoxicillin-clavulanate (AUGMENTIN) 875-125 mg per tablet 1 tablet, Oral, Every 12 hours    ascorbic acid (VITAMIN C) 1,000 mg, Daily    Cholecalciferol (VITAMIN D3) 1,000 Units, Daily    dorzolamide-timolol (COSOPT) 22.3-6.8 MG/ML ophthalmic solution No dose, route, or frequency recorded.    ketorolac (ACULAR) 0.4 % SOLN No dose, route, or frequency recorded.    latanoprost (XALATAN) 0.005 % ophthalmic solution     lisinopril (ZESTRIL) 10 mg tablet     metoprolol succinate (TOPROL-XL) 25 mg 24 hr tablet metoprolol succinate ER 25 mg tablet,extended release 24 hr    Misc Natural Products (GERMANIUM PO) Take by mouth    ondansetron (ZOFRAN) 4 mg, Oral, Every 6 hours    ondansetron (ZOFRAN-ODT) 4 mg, Oral, Every 8 hours PRN    pantoprazole (PROTONIX) 40 mg tablet TAKE ONE TABLET BY MOUTH TWICE A DAY- TAKE 1/2 HOUR BEFORE BREAKFAST AND DINNER.    prednisoLONE acetate (PRED FORTE) 1 % ophthalmic suspension No dose, route, or frequency recorded.    pyridoxine (VITAMIN B6) 50 mg tablet Take by mouth    sucralfate (CARAFATE) 1 g, Oral, 4 times daily    Turmeric 500 mg, Daily    Zinc 50 mg, Daily   Allergies[4]      Objective   /80 (BP Location: Left arm, Patient Position: Sitting, Cuff Size: Adult)   Ht 5' 4\" (1.626 m)   Wt 70.1 kg (154 lb 9.6 oz)   BMI 26.54 kg/m²      Physical Exam  Constitutional:       General: She is not in acute distress.  HENT:      Head: Normocephalic and " atraumatic.      Mouth/Throat:      Mouth: Mucous membranes are moist.     Cardiovascular:      Rate and Rhythm: Normal rate.   Pulmonary:      Effort: Pulmonary effort is normal. No respiratory distress.   Abdominal:      General: There is no distension.      Tenderness: There is no abdominal tenderness.   Genitourinary:     Comments: Stage 2 apical prolapse  Mild cystocele and rectocele noted    Skin:     General: Skin is warm and dry.     Neurological:      Mental Status: She is alert.     Psychiatric:         Mood and Affect: Mood normal.         Behavior: Behavior normal.                [1]   Past Medical History:  Diagnosis Date    Anxiety     Arthritis     Colon polyp     Coronary artery disease     S v t    Diverticulitis     Diverticulitis of colon     GERD (gastroesophageal reflux disease)     Hiatal hernia     Prolapsed uterus     SVT (supraventricular tachycardia) (HCC)     last had 2 years ago; last saw cardiologist 9 months ago-had EKG- no problems at this visit.  10/30/23   [2]   Past Surgical History:  Procedure Laterality Date    CATARACT EXTRACTION, BILATERAL      COLONOSCOPY      EGD      RETINAL DETACHMENT SURGERY     [3]   Family History  Problem Relation Name Age of Onset    Cancer Mother Ileana     Colon cancer Mother Ileana     Brain cancer Father Paddy esteban     Cancer Father Paddy esteban     Arthritis Brother Herman     Diabetes Brother Valdemar    [4]   Allergies  Allergen Reactions    Bactrim [Sulfamethoxazole-Trimethoprim] Rash

## 2025-06-12 PROBLEM — N81.4 UTEROVAGINAL PROLAPSE: Status: ACTIVE | Noted: 2025-06-12

## 2025-06-13 NOTE — ASSESSMENT & PLAN NOTE
Exam findings reviewed with pt in detail today and [facility] patient information sheets on POP and treatment reviewed with pt  Treatment options reviewed extensively including surgical management and conservative management with a pessary  Reviewed process of pessary fitting and maintenance with pt  She will return to office for a pessary fitting

## 2025-07-30 ENCOUNTER — PATIENT MESSAGE (OUTPATIENT)
Age: 75
End: 2025-07-30

## 2025-07-30 DIAGNOSIS — N81.4 UTEROVAGINAL PROLAPSE: Primary | ICD-10-CM
